# Patient Record
Sex: MALE | Race: BLACK OR AFRICAN AMERICAN | NOT HISPANIC OR LATINO | Employment: FULL TIME | URBAN - METROPOLITAN AREA
[De-identification: names, ages, dates, MRNs, and addresses within clinical notes are randomized per-mention and may not be internally consistent; named-entity substitution may affect disease eponyms.]

---

## 2017-04-05 ENCOUNTER — HOSPITAL ENCOUNTER (EMERGENCY)
Facility: CLINIC | Age: 27
Discharge: HOME | End: 2017-04-05
Attending: EMERGENCY MEDICINE | Admitting: EMERGENCY MEDICINE

## 2017-04-05 VITALS
RESPIRATION RATE: 16 BRPM | SYSTOLIC BLOOD PRESSURE: 134 MMHG | HEART RATE: 71 BPM | DIASTOLIC BLOOD PRESSURE: 83 MMHG | TEMPERATURE: 97.8 F | OXYGEN SATURATION: 99 %

## 2017-04-05 DIAGNOSIS — S61.211A LACERATION OF LEFT INDEX FINGER: ICD-10-CM
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
## 2017-04-05 PROCEDURE — 99283 EMERGENCY DEPT VISIT LOW MDM: CPT | Performed by: EMERGENCY MEDICINE

## 2017-04-05 PROCEDURE — 12001 RPR S/N/AX/GEN/TRNK 2.5CM/<: CPT | Mod: Z6 | Performed by: EMERGENCY MEDICINE

## 2017-04-05 PROCEDURE — 12001 RPR S/N/AX/GEN/TRNK 2.5CM/<: CPT | Performed by: EMERGENCY MEDICINE

## 2017-04-05 PROCEDURE — 99283 EMERGENCY DEPT VISIT LOW MDM: CPT | Mod: 25 | Performed by: EMERGENCY MEDICINE

## 2017-04-05 RX ORDER — LIDOCAINE HYDROCHLORIDE 10 MG/ML
10 INJECTION, SOLUTION INFILTRATION; PERINEURAL
Status: DISCONTINUED | OUTPATIENT
Start: 2017-04-05 | End: 2017-04-05 | Stop reason: HOSPADM

## 2017-04-05 ASSESSMENT — ENCOUNTER SYMPTOMS
BRUISES/BLEEDS EASILY: 0
FEVER: 0
CONSTITUTIONAL NEGATIVE: 1
HEADACHES: 0
LIGHT-HEADEDNESS: 0
NUMBNESS: 0
CHILLS: 0
FATIGUE: 0
WOUND: 1
NAUSEA: 0
ARTHRALGIAS: 0
VOMITING: 0
JOINT SWELLING: 0
DIZZINESS: 0
WEAKNESS: 0

## 2017-04-05 NOTE — ED PROVIDER NOTES
"  History     Chief Complaint   Patient presents with     Laceration     HPI  Jim Park is a 26 year old male who presents for evaluation of a left hand laceration. The patient reports that he was cleaning out a vacuum at work about 2-3 hours prior to his arrival here, when he inadvertently cut the palmar aspect of his left index finger on a razor that he didn't notice. He states the wound has bled actively since the incident. He reports that he sustained no other injury from this, and he has no other complaints presently. He reports that he is otherwise very healthy. The patient reports that his Tetanus is up-to-date, which is also confirmed in the [facility]. No neurovascular symptoms. No foreign body sensation.     I have reviewed the Medications, Allergies, Past Medical and Surgical History, and Social History in the Epic system.    Current Facility-Administered Medications   Medication     lidocaine 1 % injection 10 mL     No current outpatient prescriptions on file.     History reviewed. No pertinent past medical history.    History reviewed. No pertinent surgical history.    No family history on file.    Social History   Substance Use Topics     Smoking status: Current Every Day Smoker     Packs/day: 0.25     Smokeless tobacco: Not on file     Alcohol use Yes      Comment: \"Lots of Beata\"        Allergies   Allergen Reactions     Trazodone Nausea and Vomiting       Review of Systems   Constitutional: Negative.  Negative for chills, fatigue and fever.   Gastrointestinal: Negative for nausea and vomiting.   Musculoskeletal: Negative for arthralgias and joint swelling.   Skin: Positive for wound (laceration to palmar aspect of left index finger). Negative for rash.   Allergic/Immunologic: Negative for immunocompromised state.   Neurological: Negative for dizziness, weakness, light-headedness, numbness and headaches.   Hematological: Does not bruise/bleed easily.   All other systems reviewed and are " negative.      Physical Exam   BP: 134/83  Pulse: 63  Temp: 97.8  F (36.6  C)  Resp: 16  SpO2: 99 %  Physical Exam   Constitutional: He appears well-developed and well-nourished. No distress.   HENT:   Head: Normocephalic and atraumatic.   Eyes: Conjunctivae are normal.   Cardiovascular: Normal rate, regular rhythm, normal heart sounds and intact distal pulses.    Pulmonary/Chest: Effort normal and breath sounds normal. No respiratory distress.   Musculoskeletal: Normal range of motion. He exhibits no edema or deformity.        Left hand: He exhibits tenderness and laceration. He exhibits normal range of motion, no bony tenderness, normal two-point discrimination, normal capillary refill and no swelling. Normal sensation noted. Normal strength noted.   Neurological: He is alert. He has normal strength. No sensory deficit.   Skin: Skin is warm and dry. No rash noted. No erythema.   See laceration description.   Psychiatric: He has a normal mood and affect. His behavior is normal.   Nursing note and vitals reviewed.      ED Course     ED Course     1:10 AM  The patient was seen and examined by Darrell Knapp MD, in Room 09.     Procedures             Critical Care time:  [facility] Procedure Note        Laceration Repair:    Performed by: Darrell Knapp  Authorized by: Darrell Knapp  Consent given by: Patient who states understanding of the procedure being performed after discussing the risks, benefits and alternatives.    Preparation: Patient was prepped and draped in usual sterile fashion.  Irrigation solution: saline    Body area:left index finger  Laceration length: 1cm  Contamination: The wound is not contaminated.  Foreign bodies:none  Tendon involvement: none  Anesthesia: Local  Local anesthetic: Lidocaine     1%  Anesthetic total: 3ml    Debridement: none  Skin closure: Closed with 2 x 5.0 Ethilon  Technique: interrupted  Approximation: close  Approximation difficulty: simple    Patient  tolerance: Patient tolerated the procedure well with no immediate complications.            Labs Ordered and Resulted from Time of ED Arrival Up to the Time of Departure from the ED - No data to display    Assessments & Plan (with Medical Decision Making)   Laceration of left index finger. Laceration repair as noted. Wound check 2 days; suture removal 10 days. Laceration care information given.    I have reviewed the nursing notes.    I have reviewed the findings, diagnosis, plan and need for follow up with the patient.    There are no discharge medications for this patient.      Final diagnoses:   Laceration of left index finger     I, Augustine Azevedo, am serving as a trained medical scribe to document services personally performed by Darrell Knapp MD, based on the provider's statements to me.      IDarrell MD, was physically present and have reviewed and verified the accuracy of this note documented by Augustine Azevedo.    4/5/2017   [facility], EMERGENCY DEPARTMENT     Darrell Knapp MD  04/05/17 0154

## 2017-04-05 NOTE — ED AVS SNAPSHOT
[facility], Emergency Department    [address] [address]    Phone:  [phone]                                       Jim Park   MRN: [id number]    Department:  [facility], Emergency Department   Date of Visit:  4/5/2017           Patient Information     Date Of Birth          1990        Your diagnoses for this visit were:     Laceration of left index finger        You were seen by Darrell Knapp MD.        Discharge Instructions          * LACERATION (All Closures)  A laceration is a cut through the skin. This will usually require stitches (sutures) or staples if it is deep. Minor cuts may be treated with a tape closure ( Steri-Strips ) or Dermabond skin glue.       HOME CARE:  PAIN MEDICINE: You may use acetaminophen (Tylenol) 650-1000 mg every 6 hours or ibuprofen (Motrin, Advil) 600 mg every 6-8 hours with food to control pain, if you are able to take these medicines. [NOTE: If you have chronic liver or kidney disease or ever had a stomach ulcer or GI bleeding, talk with your doctor before using these medicines.]  EXTREMITY, FACE or TRUNK WOUNDS:    Keep the wound clean and dry. If a bandage was applied and it becomes wet or dirty, replace it. Otherwise, leave it in place for the first 24 hours.    If stitches or staples were used, clean the wound daily. Protect the wound from sunlight and tanning lamps.    After removing the bandage, wash the area with soap and water. Use a wet cotton swab (Q tip) to loosen and remove any blood or crust that forms.    After cleaning, apply a thin layer of Polysporin or Bacitracin ointment. This will keep the wound clean and make it easier to remove the stitches or staples. Reapply a fresh bandage.    You may remove the bandage to shower as usual after the first 24 hours, but do not soak the area in water (no swimming) until the stitches or staples are removed.    If Steri-Strips were used, keep the area clean and dry. If it becomes wet,  blot it dry with a towel. It is okay to take a brief shower, but avoid scrubbing the area.    If Dermabond skin adhesive was used, do not scratch, rub or pick at the adhesive film. Do not place tape directly over the film. Do not apply liquid, ointment or creams to the wound while the film is in place. Do not clean the wound with peroxide and do not apply ointments. Avoid activities that cause heavy sweating until the film has fallen off. Protect the wound from prolonged exposure to sunlight or tanning lamps. You may shower as usual but do not soak the wound in water (no baths or swimming). The film will fall off by itself in 5-10 days.  SCALP WOUNDS: During the first two days, you may carefully rinse your hair in the shower to remove blood, glass or dirt particles. After two days, you may shower and shampoo your hair normally. Do not soak your scalp in the tub or go swimming until the stitches or staples have been removed.  MOUTH WOUNDS: Eat soft foods to reduce pain. If the cut is inside of your mouth, clean by rinsing after each meal and at bedtime with a mixture of equal parts water and Hydrogen Peroxide (do not swallow!). Or, you can use a cotton swab to directly apply Hydrogen Peroxide onto the cut.  After the wound is done healing, use sunscreen over the area whenever exposed for the next 6 minths to avoid a darker scar.     FOLLOW UP: Most skin wounds heal within ten days. Mouth and facial wounds heal within five days. However, even with proper treatment, a wound infection may sometimes occur. Therefore, you should check the wound daily for signs of infection listed below.  Stitches should be removed from the face within five days; stitches and staples should be removed from other parts of the body within 7-10 days. Unless you are told to come back to the emergency room, you may have your doctor or urgent care remove the stitches. If dissolving stitches were used in the mouth, these will fall out or dissolve  without the need for removal. If tape closures ( Steri-Strips ) were used, remove them yourself if they have not fallen off after 7 days. If Dermabond skin glue was used, the film will fall off by itself in 5-10 days.      GET PROMPT MEDICAL ATTENTION  if any of the following occur:    Increasing pain in the wound    Redness, swelling or pus coming from the wound    Fever over 101 F (38.3 C) oral    If stitches or staples come apart or fall out or if Steri-Strips fall off before seven days    If the wound edges re-open    Bleeding not controlled by direct pressure    8395-9011 [facility], [address] [place] [address], [place] [address]. All rights reserved. This information is not intended as a substitute for professional medical care. Always follow your healthcare professional's instructions.  Keep wound clean, dry, and covered. Apply topical antibiotic to wound edges such as bacitracin or similar.  Wound check 2 days and suture removal at clinic in 10 days.  Return if any problems with wound healing, especially if bleeding, increased pain, loss of movement or sensation, fever, increased swelling.    24 Hour Appointment Hotline       To make an appointment at any [facility], call [phone] ([phone]). If you don't have a family doctor or clinic, we will help you find one. [facility] clinics are conveniently located to serve the needs of you and your family.             Review of your medicines      Notice     You have not been prescribed any medications.            Orders Needing Specimen Collection     None      Pending Results     No orders found from 4/3/2017 to 4/6/2017.            Pending Culture Results     No orders found from 4/3/2017 to 4/6/2017.            Thank you for choosing [facility]       Thank you for choosing [facility] for your care. Our goal is always to provide you with excellent care. Hearing back from our patients is one way we can continue to improve our services. Please take a few  "minutes to complete the written survey that you may receive in the mail after you visit with us. Thank you!        [facility] [employer] Information     [employer] lets you send messages to your doctor, view your test results, renew your prescriptions, schedule appointments and more. To sign up, go to [email or web address] . Click on \"Log in\" on the left side of the screen, which will take you to the Welcome page. Then click on \"Sign up Now\" on the right side of the page.     You will be asked to enter the access code listed below, as well as some personal information. Please follow the directions to create your username and password.     Your access code is: [email or web address]  Expires: 2017  1:33 AM     Your access code will  in 90 days. If you need help or a new code, please call your [facility] clinic or [phone].        Care EveryWhere ID     This is your Care EveryWhere ID. This could be used by other organizations to access your [facility] medical records  [id number]        After Visit Summary       This is your record. Keep this with you and show to your community pharmacist(s) and doctor(s) at your next visit.                  "

## 2017-04-05 NOTE — ED AVS SNAPSHOT
[facility], [place], Emergency Department    [address] [place] [address]    Phone:  [phone]                                       Jim Park   MRN: [id number]    Department:  [facility], Emergency Department   Date of Visit:  4/5/2017           After Visit Summary Signature Page     I have received my discharge instructions, and my questions have been answered. I have discussed any challenges I see with this plan with the nurse or doctor.    ..........................................................................................................................................  Patient/Patient Representative Signature      ..........................................................................................................................................  Patient Representative Print Name and Relationship to Patient    ..................................................               ................................................  Date                                            Time    ..........................................................................................................................................  Reviewed by Signature/Title    ...................................................              ..............................................  Date                                                            Time

## 2017-04-05 NOTE — DISCHARGE INSTRUCTIONS
* LACERATION (All Closures)  A laceration is a cut through the skin. This will usually require stitches (sutures) or staples if it is deep. Minor cuts may be treated with a tape closure ( Steri-Strips ) or Dermabond skin glue.       HOME CARE:  PAIN MEDICINE: You may use acetaminophen (Tylenol) 650-1000 mg every 6 hours or ibuprofen (Motrin, Advil) 600 mg every 6-8 hours with food to control pain, if you are able to take these medicines. [NOTE: If you have chronic liver or kidney disease or ever had a stomach ulcer or GI bleeding, talk with your doctor before using these medicines.]  EXTREMITY, FACE or TRUNK WOUNDS:    Keep the wound clean and dry. If a bandage was applied and it becomes wet or dirty, replace it. Otherwise, leave it in place for the first 24 hours.    If stitches or staples were used, clean the wound daily. Protect the wound from sunlight and tanning lamps.    After removing the bandage, wash the area with soap and water. Use a wet cotton swab (Q tip) to loosen and remove any blood or crust that forms.    After cleaning, apply a thin layer of Polysporin or Bacitracin ointment. This will keep the wound clean and make it easier to remove the stitches or staples. Reapply a fresh bandage.    You may remove the bandage to shower as usual after the first 24 hours, but do not soak the area in water (no swimming) until the stitches or staples are removed.    If Steri-Strips were used, keep the area clean and dry. If it becomes wet, blot it dry with a towel. It is okay to take a brief shower, but avoid scrubbing the area.    If Dermabond skin adhesive was used, do not scratch, rub or pick at the adhesive film. Do not place tape directly over the film. Do not apply liquid, ointment or creams to the wound while the film is in place. Do not clean the wound with peroxide and do not apply ointments. Avoid activities that cause heavy sweating until the film has fallen off. Protect the wound from prolonged  exposure to sunlight or tanning lamps. You may shower as usual but do not soak the wound in water (no baths or swimming). The film will fall off by itself in 5-10 days.  SCALP WOUNDS: During the first two days, you may carefully rinse your hair in the shower to remove blood, glass or dirt particles. After two days, you may shower and shampoo your hair normally. Do not soak your scalp in the tub or go swimming until the stitches or staples have been removed.  MOUTH WOUNDS: Eat soft foods to reduce pain. If the cut is inside of your mouth, clean by rinsing after each meal and at bedtime with a mixture of equal parts water and Hydrogen Peroxide (do not swallow!). Or, you can use a cotton swab to directly apply Hydrogen Peroxide onto the cut.  After the wound is done healing, use sunscreen over the area whenever exposed for the next 6 minths to avoid a darker scar.     FOLLOW UP: Most skin wounds heal within ten days. Mouth and facial wounds heal within five days. However, even with proper treatment, a wound infection may sometimes occur. Therefore, you should check the wound daily for signs of infection listed below.  Stitches should be removed from the face within five days; stitches and staples should be removed from other parts of the body within 7-10 days. Unless you are told to come back to the emergency room, you may have your doctor or urgent care remove the stitches. If dissolving stitches were used in the mouth, these will fall out or dissolve without the need for removal. If tape closures ( Steri-Strips ) were used, remove them yourself if they have not fallen off after 7 days. If Dermabond skin glue was used, the film will fall off by itself in 5-10 days.      GET PROMPT MEDICAL ATTENTION  if any of the following occur:    Increasing pain in the wound    Redness, swelling or pus coming from the wound    Fever over 101 F (38.3 C) oral    If stitches or staples come apart or fall out or if Steri-Strips fall  off before seven days    If the wound edges re-open    Bleeding not controlled by direct pressure    9446-7347 [employer], [address] [facility], [place] [address]. All rights reserved. This information is not intended as a substitute for professional medical care. Always follow your healthcare professional's instructions.  Keep wound clean, dry, and covered. Apply topical antibiotic to wound edges such as bacitracin or similar.  Wound check 2 days and suture removal at clinic in 10 days.  Return if any problems with wound healing, especially if bleeding, increased pain, loss of movement or sensation, fever, increased swelling.

## 2017-04-05 NOTE — ED NOTES
Pt arrived to ED after accidentally cutting his finger with a knife while cleaning. Pt states his finger has been bleeding for approx 2 hours. Finger has been cleaned during triage. Bleeding has stopped

## 2017-06-13 ENCOUNTER — HOSPITAL ENCOUNTER (EMERGENCY)
Facility: CLINIC | Age: 27
Discharge: HOME | End: 2017-06-13
Attending: EMERGENCY MEDICINE | Admitting: EMERGENCY MEDICINE

## 2017-06-13 ENCOUNTER — APPOINTMENT (OUTPATIENT)
Dept: GENERAL RADIOLOGY | Facility: CLINIC | Age: 27
End: 2017-06-13
Attending: EMERGENCY MEDICINE

## 2017-06-13 VITALS
OXYGEN SATURATION: 100 % | DIASTOLIC BLOOD PRESSURE: 80 MMHG | SYSTOLIC BLOOD PRESSURE: 155 MMHG | TEMPERATURE: 97.4 F | WEIGHT: 220 LBS | RESPIRATION RATE: 18 BRPM | HEART RATE: 85 BPM | BODY MASS INDEX: 33.34 KG/M2 | HEIGHT: 68 IN
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
## 2017-06-13 DIAGNOSIS — S60.211A CONTUSION OF RIGHT WRIST, INITIAL ENCOUNTER: ICD-10-CM

## 2017-06-13 PROCEDURE — 25000132 ZZH RX MED GY IP 250 OP 250 PS 637: Performed by: EMERGENCY MEDICINE

## 2017-06-13 PROCEDURE — 99283 EMERGENCY DEPT VISIT LOW MDM: CPT | Mod: Z6 | Performed by: EMERGENCY MEDICINE

## 2017-06-13 PROCEDURE — 99283 EMERGENCY DEPT VISIT LOW MDM: CPT | Performed by: EMERGENCY MEDICINE

## 2017-06-13 PROCEDURE — 29125 APPL SHORT ARM SPLINT STATIC: CPT | Mod: RT | Performed by: EMERGENCY MEDICINE

## 2017-06-13 PROCEDURE — 73110 X-RAY EXAM OF WRIST: CPT | Mod: RT

## 2017-06-13 RX ORDER — ACETAMINOPHEN 500 MG
1000 TABLET ORAL ONCE
Status: COMPLETED | OUTPATIENT
Start: 2017-06-13 | End: 2017-06-13

## 2017-06-13 RX ADMIN — ACETAMINOPHEN 1000 MG: 500 TABLET, FILM COATED ORAL at 03:40

## 2017-06-13 ASSESSMENT — ENCOUNTER SYMPTOMS
ABDOMINAL PAIN: 0
FEVER: 0
SHORTNESS OF BREATH: 0

## 2017-06-13 NOTE — DISCHARGE INSTRUCTIONS
Please make an appointment to follow up with Your Primary Care Provider in 7-10 days        Understanding Bone Bruise (Bone Contusion)  A bone bruise is an injury to a bone that is less severe than a bone fracture. Bone bruises are fairly common. They can happen to people of all ages. Any type of bone in your body can get a bone bruise. Other injuries often happen along with a bone bruise, such as damage to nearby ligaments.  What happens when a bone is bruised?  Bone is made of different kinds of tissue. The periosteum is a thin layer of tissue that covers most of a bone. Where bones come together, there is usually a layer of cartilage at the edges. The bone here is called subchondral bone. Deep inside the bone is an area called the medulla. It contains the bone marrow and fibrous tissue called trabeculae.  With a bone fracture, all of the trabeculae in a region of bone have broken. But with a bone bruise, an injury only damages some of these trabeculae. An injury might cause blood to build up in the area beneath the periosteum. This causes a subperiosteal hematoma, a type of bone bruise. An injury might also cause bleeding and swelling in the area between your cartilage and the bone beneath it. This causes a subchondral bone bruise. Or bleeding and swelling can occur in the medulla of your bone. This is called an interosseous bone bruise.  What causes a bone bruise?  Injury of any kind can cause a bone bruise. Sports injuries, motor vehicle accidents, or falls from a height can cause them. Twisting injuries that cause joint sprains can also cause a bone bruise. Health conditions like arthritis may also lead to a bone bruise. This is because arthritis causes bone surfaces to grind against each other. Child abuse is another cause of bone bruises.  Symptoms of a bone bruise  Symptoms of a bone bruise can include:    Pain and soreness in the injured area    Swelling in the area and soft tissues around it    Change in  color of the injured area    Swelling or stiffness of an injured joint  This pain is often more severe and lasts longer than a soft tissue injury. How severe your symptoms are and how long they last depends on how severe the bone bruise is.  Diagnosing a bone bruise  Your health care provider will ask you about your medical history and symptoms. He or she will ask how you got your injury. Your provider will examine the injured area to check for pain, bruising, and swelling. After the exam, your health care provider may be able to tell if you have a bone bruise.  A bone bruise doesn t show up on an X-ray. But you may be given an X-ray to rule out a bone fracture. A fracture may need a different kind of treatment. An MRI can confirm a bone bruise. But your health care provider will likely only give you an MRI if your symptoms don t get better.    8619-2924 [employer]. [address] [place] [address], [place] [address]. All rights reserved. This information is not intended as a substitute for professional medical care. Always follow your healthcare professional's instructions.

## 2017-06-13 NOTE — ED NOTES
"Triage Assessment & Note:    /87  Pulse 91  Temp 97.4  F (36.3  C) (Oral)  Resp 18  Ht 1.727 m (5' 8\")  Wt 99.8 kg (220 lb)  SpO2 100%  BMI 33.45 kg/m2    Patient presents with: right wrist pain. PT reports striking his right hand hard on something at work. t has positive CMS.     Home Treatments/Remedies: none    Febrile / Afebrile? afebrile    Duration of C/o:  24hrs    Len Mcbride  June 13, 2017      "

## 2017-06-13 NOTE — ED AVS SNAPSHOT
[facility], [place], Emergency Department    [address] [place] [address]    Phone:  [phone]                                       Jim Park   MRN: [id number]    Department:  [facility], Emergency Department   Date of Visit:  6/13/2017           After Visit Summary Signature Page     I have received my discharge instructions, and my questions have been answered. I have discussed any challenges I see with this plan with the nurse or doctor.    ..........................................................................................................................................  Patient/Patient Representative Signature      ..........................................................................................................................................  Patient Representative Print Name and Relationship to Patient    ..................................................               ................................................  Date                                            Time    ..........................................................................................................................................  Reviewed by Signature/Title    ...................................................              ..............................................  Date                                                            Time

## 2017-06-13 NOTE — ED AVS SNAPSHOT
[facility], Emergency Department    [address] [address]    Phone:  [phone]                                       Jim Park   MRN: [id number]    Department:  [facility], Emergency Department   Date of Visit:  6/13/2017           Patient Information     Date Of Birth          1990        Your diagnoses for this visit were:     Contusion of right wrist, initial encounter        You were seen by Cody Reed MD.        Discharge Instructions       Please make an appointment to follow up with Your Primary Care Provider in 7-10 days        Understanding Bone Bruise (Bone Contusion)  A bone bruise is an injury to a bone that is less severe than a bone fracture. Bone bruises are fairly common. They can happen to people of all ages. Any type of bone in your body can get a bone bruise. Other injuries often happen along with a bone bruise, such as damage to nearby ligaments.  What happens when a bone is bruised?  Bone is made of different kinds of tissue. The periosteum is a thin layer of tissue that covers most of a bone. Where bones come together, there is usually a layer of cartilage at the edges. The bone here is called subchondral bone. Deep inside the bone is an area called the medulla. It contains the bone marrow and fibrous tissue called trabeculae.  With a bone fracture, all of the trabeculae in a region of bone have broken. But with a bone bruise, an injury only damages some of these trabeculae. An injury might cause blood to build up in the area beneath the periosteum. This causes a subperiosteal hematoma, a type of bone bruise. An injury might also cause bleeding and swelling in the area between your cartilage and the bone beneath it. This causes a subchondral bone bruise. Or bleeding and swelling can occur in the medulla of your bone. This is called an interosseous bone bruise.  What causes a bone bruise?  Injury of any kind can cause a bone bruise. Sports injuries,  motor vehicle accidents, or falls from a height can cause them. Twisting injuries that cause joint sprains can also cause a bone bruise. Health conditions like arthritis may also lead to a bone bruise. This is because arthritis causes bone surfaces to grind against each other. Child abuse is another cause of bone bruises.  Symptoms of a bone bruise  Symptoms of a bone bruise can include:    Pain and soreness in the injured area    Swelling in the area and soft tissues around it    Change in color of the injured area    Swelling or stiffness of an injured joint  This pain is often more severe and lasts longer than a soft tissue injury. How severe your symptoms are and how long they last depends on how severe the bone bruise is.  Diagnosing a bone bruise  Your health care provider will ask you about your medical history and symptoms. He or she will ask how you got your injury. Your provider will examine the injured area to check for pain, bruising, and swelling. After the exam, your health care provider may be able to tell if you have a bone bruise.  A bone bruise doesn t show up on an X-ray. But you may be given an X-ray to rule out a bone fracture. A fracture may need a different kind of treatment. An MRI can confirm a bone bruise. But your health care provider will likely only give you an MRI if your symptoms don t get better.    7305-7843 [employer]. [address] [place] [address]. All rights reserved. This information is not intended as a substitute for professional medical care. Always follow your healthcare professional's instructions.           24 Hour Appointment Hotline       To make an appointment at any [facility] clinic, call [phone] ([phone]). If you don't have a family doctor or clinic, we will help you find one. [facility] clinics are conveniently located to serve the needs of you and your family.          ED Discharge Orders     Wrist splint velcro                   "  Review of your medicines      Notice     You have not been prescribed any medications.            Procedures and tests performed during your visit     Wrist XR, G/E 3 views, right      Orders Needing Specimen Collection     None      Pending Results     Date and Time Order Name Status Description    2017 0306 Wrist XR, G/E 3 views, right Preliminary             Pending Culture Results     No orders found from 2017 to 2017.            Pending Results Instructions     If you had any lab results that were not finalized at the time of your Discharge, you can call the ED Lab Result RN at [phone]. You will be contacted by this team for any positive Lab results or changes in treatment. The nurses are available 7 days a week from 10A to 6:30P.  You can leave a message 24 hours per day and they will return your call.        Thank you for choosing [facility]       Thank you for choosing [facility] for your care. Our goal is always to provide you with excellent care. Hearing back from our patients is one way we can continue to improve our services. Please take a few minutes to complete the written survey that you may receive in the mail after you visit with us. Thank you!        [employer] Information     [employer] lets you send messages to your doctor, view your test results, renew your prescriptions, schedule appointments and more. To sign up, go to [email or web address] . Click on \"Log in\" on the left side of the screen, which will take you to the Welcome page. Then click on \"Sign up Now\" on the right side of the page.     You will be asked to enter the access code listed below, as well as some personal information. Please follow the directions to create your username and password.     Your access code is: [email or web address]  Expires: 2017  1:33 AM     Your access code will  in 90 days. If you need help or a new code, please call your [facility] clinic or [phone].        Care EveryWhere ID     This is " your Care EveryWhere ID. This could be used by other organizations to access your [facility] medical records  [id number]        After Visit Summary       This is your record. Keep this with you and show to your community pharmacist(s) and doctor(s) at your next visit.

## 2017-06-13 NOTE — ED PROVIDER NOTES
"  History     Chief Complaint   Patient presents with     Wrist Pain     HPI  Jim Park is a 27 year old right handed male who presents with right wrist pain.  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  Has had worsening pain.  Has noticed worsening swelling.  Denies any numbness and tingling.  No fevers or chills.  No other pain.  Has not taken anything for this.    I have reviewed the Medications, Allergies, Past Medical and Surgical History, and Social History in the Epic system.  No past medical history on file.    No past surgical history on file.    No family history on file.    Social History   Substance Use Topics     Smoking status: Current Every Day Smoker     Packs/day: 0.25     Smokeless tobacco: Not on file     Alcohol use Yes      Comment: \"Lots of Beata\"       Review of Systems   Constitutional: Negative for fever.   Respiratory: Negative for shortness of breath.    Cardiovascular: Negative for chest pain.   Gastrointestinal: Negative for abdominal pain.   All other systems reviewed and are negative.      Physical Exam   BP: 162/87  Pulse: 91  Temp: 97.4  F (36.3  C)  Resp: 18  Height: 172.7 cm (5' 8\")  Weight: 99.8 kg (220 lb)  SpO2: 100 %  Physical Exam   Musculoskeletal:        Right elbow: Normal.       Right wrist: He exhibits tenderness, bony tenderness and swelling. He exhibits normal range of motion, no effusion, no crepitus and no deformity.        Arms:  Neurological: He has normal strength. No sensory deficit.       ED Course     ED Course     Procedures             Critical Care time:  none              Results for orders placed or performed during the hospital encounter of 06/13/17   Wrist XR, G/E 3 views, right    Narrative    XR WRIST RT G/E 3 VW 6/13/2017 3:36 AM    History: lateral wrist pain.  hit on door.    Comparison: None.    Findings: PA, oblique, and lateral views of the right wrist. There is  normal bony alignment. No acute fracture " or subluxation is identified.  Minimal soft tissue swelling.      Impression    Impression: No acute osseous abnormality.        Labs Ordered and Resulted from Time of ED Arrival Up to the Time of Departure from the ED - No data to display         Assessments & Plan (with Medical Decision Making)   27 year old who presents with wrist pain.  Did review the x-rays.  No signs of fracture.  Likely contusion.  Discussed supportive care.  Given wrist splint for comfort.  He will continue Tylenol and ibuprofen at home.  He'll return for worsening symptoms.    I have reviewed the nursing notes.    I have reviewed the findings, diagnosis, plan and need for follow up with the patient.    There are no discharge medications for this patient.      Final diagnoses:   Contusion of right wrist, initial encounter       6/13/2017   [facility], [place], EMERGENCY DEPARTMENT     Cody Reed MD  06/13/17 9198

## 2017-09-16 ENCOUNTER — HOSPITAL ENCOUNTER (EMERGENCY)
Facility: CLINIC | Age: 27
Discharge: HOME | End: 2017-09-16
Attending: EMERGENCY MEDICINE | Admitting: EMERGENCY MEDICINE

## 2017-09-16 VITALS
DIASTOLIC BLOOD PRESSURE: 89 MMHG | BODY MASS INDEX: 30.84 KG/M2 | TEMPERATURE: 98.1 F | OXYGEN SATURATION: 98 % | WEIGHT: 202.82 LBS | HEART RATE: 82 BPM | SYSTOLIC BLOOD PRESSURE: 141 MMHG

## 2017-09-16 DIAGNOSIS — R07.0 THROAT PAIN: ICD-10-CM

## 2017-09-16 DIAGNOSIS — J06.9 VIRAL URI WITH COUGH: ICD-10-CM

## 2017-09-16 LAB
DEPRECATED S PYO AG THROAT QL EIA: NORMAL
SPECIMEN SOURCE: NORMAL

## 2017-09-16 PROCEDURE — 87880 STREP A ASSAY W/OPTIC: CPT | Performed by: EMERGENCY MEDICINE

## 2017-09-16 PROCEDURE — 99283 EMERGENCY DEPT VISIT LOW MDM: CPT

## 2017-09-16 PROCEDURE — 99283 EMERGENCY DEPT VISIT LOW MDM: CPT | Mod: Z6 | Performed by: EMERGENCY MEDICINE

## 2017-09-16 PROCEDURE — 87081 CULTURE SCREEN ONLY: CPT | Performed by: EMERGENCY MEDICINE

## 2017-09-16 RX ORDER — ACETAMINOPHEN 325 MG/1
650 TABLET ORAL ONCE
Status: DISCONTINUED | OUTPATIENT
Start: 2017-09-16 | End: 2017-09-17 | Stop reason: HOSPADM

## 2017-09-16 NOTE — ED AVS SNAPSHOT
[facility], Emergency Department    [address] [address]    Phone:  [phone]                                       Jim Park   MRN: [id number]    Department:  [facility], Emergency Department   Date of Visit:  9/16/2017           Patient Information     Date Of Birth          1990        Your diagnoses for this visit were:     Throat pain     Viral URI with cough        You were seen by Juan Ayala MD.        Discharge Instructions       Your rapid strep swab was negative.       Please make an appointment to [facility] with [facility] (phone: [phone] in 3-5 days if not improving.    Return to the ED if you are having difficulty breathing, firmness under the tongue, worsening symptoms, or any other urgent/life-threatening concerns.       24 Hour Appointment Hotline       To make an appointment at any [facility] clinic, call [phone] ([phone]). If you don't have a family doctor or clinic, we will help you find one. [facility] clinics are conveniently located to serve the needs of you and your family.             Review of your medicines      Notice     You have not been prescribed any medications.            Procedures and tests performed during your visit     Beta strep group A culture    Rapid strep screen      Orders Needing Specimen Collection     None      Pending Results     Date and Time Order Name Status Description    9/16/2017 2125 Beta strep group A culture Preliminary             Pending Culture Results     Date and Time Order Name Status Description    9/16/2017 2125 Beta strep group A culture Preliminary             Pending Results Instructions     If you had any lab results that were not finalized at the time of your Discharge, you can call the ED Lab Result RN at [phone]. You will be contacted by this team for any positive Lab results or changes in treatment. The nurses are available 7 days a week from 10A to 6:30P.  You can  "leave a message 24 hours per day and they will return your call.        Thank you for choosing [facility]       Thank you for choosing [facility] for your care. Our goal is always to provide you with excellent care. Hearing back from our patients is one way we can continue to improve our services. Please take a few minutes to complete the written survey that you may receive in the mail after you visit with us. Thank you!        [employer] Information     [employer] lets you send messages to your doctor, view your test results, renew your prescriptions, schedule appointments and more. To sign up, go to [email or web address] . Click on \"Log in\" on the left side of the screen, which will take you to the Welcome page. Then click on \"Sign up Now\" on the right side of the page.     You will be asked to enter the access code listed below, as well as some personal information. Please follow the directions to create your username and password.     Your access code is: [email or web address]  Expires: 12/15/2017 10:42 PM     Your access code will  in 90 days. If you need help or a new code, please call your [facility] clinic or [phone].        Care EveryWhere ID     This is your Care EveryWhere ID. This could be used by other organizations to access your [facility] medical records  [id number]        Equal Access to Services     SIDNEY POE : Hadafrica starkso Sosocorro, waaxda luqadaha, qaybta kaalmada adedaeyaanne, weston pinzon . So [facility] [phone].    ATENCIÓN: Si habla español, tiene a nation disposición servicios gratuitos de asistencia lingüística. Llame al [phone].    We comply with applicable federal civil rights laws and [place] laws. We do not discriminate on the basis of race, color, national origin, age, disability sex, sexual orientation or gender identity.            After Visit Summary       This is your record. Keep this with you and show to your community pharmacist(s) and doctor(s) at " your next visit.

## 2017-09-16 NOTE — ED AVS SNAPSHOT
[facility], [place], Emergency Department    [address] [place] [address]    Phone:  [phone]                                       Jim Park   MRN: [id number]    Department:  [facility], Emergency Department   Date of Visit:  9/16/2017           After Visit Summary Signature Page     I have received my discharge instructions, and my questions have been answered. I have discussed any challenges I see with this plan with the nurse or doctor.    ..........................................................................................................................................  Patient/Patient Representative Signature      ..........................................................................................................................................  Patient Representative Print Name and Relationship to Patient    ..................................................               ................................................  Date                                            Time    ..........................................................................................................................................  Reviewed by Signature/Title    ...................................................              ..............................................  Date                                                            Time

## 2017-09-17 NOTE — ED PROVIDER NOTES
"  History     Chief Complaint   Patient presents with     Pharyngitis     Headache     HPI  Jim Park is a 27 year old otherwise healthy male who presents with sore throat. Patient complains he has had cold-type symptoms over the past one week with sore throat, hoarse voice, cough, shortness of breath, and generalized fatigue. Tonight he complains that his sore throat has continued to worsen and today he began having pain with swallowing. He also complains of a right-sided headache that began last night as well. He did take ibuprofen for his headache prior to arrival which helped. No vision changes. No photophobia or sensitivity to noise. He denies fevers or chills. He notes his brother was recently ill with similar symptoms as well. He is not on any daily medications.      I have reviewed the Medications, Allergies, Past Medical and Surgical History, and Social History in the [employer] system.  History reviewed. No pertinent past medical history.    History reviewed. No pertinent surgical history.    No family history on file.    Social History   Substance Use Topics     Smoking status: Current Every Day Smoker     Packs/day: 0.25     Smokeless tobacco: Not on file     Alcohol use Yes      Comment: \"Lots of Beata\"       No current facility-administered medications for this encounter.      No current outpatient prescriptions on file.        Allergies   Allergen Reactions     Trazodone Nausea and Vomiting       Review of Systems  A complete 14-system review of systems was completed with pertinent positives and negatives noted in the HPI, otherwise negative.     Physical Exam   BP: 141/89  Pulse: 82  Temp: 98.1  F (36.7  C)  Weight: 92 kg (202 lb 13.2 oz)  SpO2: 98 %  Physical Exam  /89  Pulse 82  Temp 98.1  F (36.7  C) (Oral)  Wt 92 kg (202 lb 13.2 oz)  SpO2 98%  BMI 30.84 kg/m2  General: well-appearing, no acute distress  HENT: MMM, no oropharyngeal lesions, no tonsillar hypertrophy, no sublingual " firmness.   Eyes: PERRL, normal sclerae, no conjunctival pallor  Neck: non-tender, supple. No pain with tracheal manipulation.   Cardio: RRR, normal heart sounds, extremities well perfused  Resp: Normal work of breathing, clear breath sounds  Chest/Back: no visual signs of trauma, no CVA tenderness  Abdomen: no tenderness, non-distended, no rebound, no guarding  Neuro: alert and fully oriented. CN II-XII grossly intact. Grossly normal strength and sensation in all extremities.   MSK: no deformities.   Integumentary/Skin: no rash, normal color  Psych: normal affect, normal behavior    ED Course     ED Course     Procedures       9:35 PM  The patient was seen and examined by Dr. Ayala in Room [facility].       Critical Care time:  None        Labs Ordered and Resulted from Time of ED Arrival Up to the Time of Departure from the ED   RAPID STREP SCREEN   BETA STREP GROUP A CULTURE            Assessments & Plan (with Medical Decision Making)   In the ED, the patient was afebrile and hemodynamically stable. History notable for congestion, sore throat, and cough. Exam notable for lack of concerning findings on oropharyngeal exam. No fever nor toxic appearance to suggest serious bacterial infection. No pain with tracheal manipulation to suggest deep neck space infection, no sublingual firmness to suggest Bertram's. The patient was given APAP for headache with improvement in symptoms. Rapid strep negative.     The complete clinical picture is most consistent with viral URI causing sore throat. After counseling on the diagnosis, work-up, and treatment plan, the patient was discharged to home. APAP and ibuprofen recommended for mild headache. Patient counseled that he would receive a call if the culture grows positive. The patient was advised to follow-up with clinic in a few days if not improved. The patient was advised to return to the ED if worsening symptoms, or if there are any urgent/life-threatening concerns.       Clinical  Impression:   Sore throat   Likely viral URI     Juan Ayala MD  Emergency Medicine       I have reviewed the nursing notes.    I have reviewed the findings, diagnosis, plan and need for follow up with the patient.    There are no discharge medications for this patient.      Final diagnoses:   Throat pain   Viral URI with cough   I, Deandra Ballard, am serving as a trained medical scribe to document services personally performed by Juan Ayala MD, based on the provider's statements to me.   IJuan MD, was physically present and have reviewed and verified the accuracy of this note documented by Deandra Ballard.      9/16/2017   [facility], [place], EMERGENCY DEPARTMENT     Juan Ayala MD  09/17/17 0427

## 2017-09-17 NOTE — DISCHARGE INSTRUCTIONS
Your rapid strep swab was negative.       Please make an appointment to follow [facility] with [facility] (phone: [phone] in 3-5 days if not improving.    Return to the ED if you are having difficulty breathing, firmness under the tongue, worsening symptoms, or any other urgent/life-threatening concerns.

## 2017-09-17 NOTE — ED NOTES
Patient arrived for a sore throat that has been bothering him for two weeks. He says it is hard to breathe and swallow. His body is also sore. He is alert, oriented, ambulatory.

## 2017-09-18 LAB
BACTERIA SPEC CULT: NORMAL
Lab: NORMAL
SPECIMEN SOURCE: NORMAL

## 2024-02-16 ENCOUNTER — HOSPITAL ENCOUNTER (EMERGENCY)
Facility: CLINIC | Age: 34
Discharge: HOME | End: 2024-02-16
Payer: MEDICAID

## 2024-02-16 VITALS
HEART RATE: 80 BPM | RESPIRATION RATE: 16 BRPM | TEMPERATURE: 97.6 F | WEIGHT: 226 LBS | HEIGHT: 69 IN | BODY MASS INDEX: 33.47 KG/M2 | OXYGEN SATURATION: 98 % | SYSTOLIC BLOOD PRESSURE: 149 MMHG | DIASTOLIC BLOOD PRESSURE: 104 MMHG

## 2024-02-16 DIAGNOSIS — S61.211A LACERATION OF LEFT INDEX FINGER: ICD-10-CM

## 2024-02-16 PROCEDURE — 12001 RPR S/N/AX/GEN/TRNK 2.5CM/<: CPT

## 2024-02-16 PROCEDURE — 99283 EMERGENCY DEPT VISIT LOW MDM: CPT | Mod: 25

## 2024-02-16 RX ORDER — IBUPROFEN 600 MG/1
600 TABLET, FILM COATED ORAL ONCE
Status: DISCONTINUED | OUTPATIENT
Start: 2024-02-16 | End: 2024-02-16 | Stop reason: HOSPADM

## 2024-02-16 RX ORDER — LIDOCAINE HYDROCHLORIDE 10 MG/ML
10 INJECTION, SOLUTION INFILTRATION; PERINEURAL ONCE
Status: DISCONTINUED | OUTPATIENT
Start: 2024-02-16 | End: 2024-02-16 | Stop reason: HOSPADM

## 2024-02-16 ASSESSMENT — ACTIVITIES OF DAILY LIVING (ADL): ADLS_ACUITY_SCORE: 35

## 2024-02-16 NOTE — ED TRIAGE NOTES
Patient not sure when he got his tetanus vaccine.      Triage Assessment (Adult)       Row Name 02/16/24 1102          Triage Assessment    Airway WDL WDL        Respiratory WDL    Respiratory WDL WDL        Skin Circulation/Temperature WDL    Skin Circulation/Temperature WDL X  finger laceration        Cardiac WDL    Cardiac WDL WDL        Peripheral/Neurovascular WDL    Peripheral Neurovascular WDL WDL

## 2024-02-16 NOTE — ED PROVIDER NOTES
ED Provider Note  [facility]      History     Chief Complaint   Patient presents with    Laceration     Left index finger laceration, patient was playing with a knife and cut self last night     The history is provided by the patient. No  was used.     Jim Park is a 33 year old male who presents to the ED with complaint of a laceration.  Past medical history notable for anxiety, alcohol use disorder, PTSD.  He states that he accidentally cut the dorsal side of his left index finger last night around 11:30 PM.  He states that he was attempting to open a package with a knife when it slipped cutting his left index finger.  He states the knife was clean at the time.  He states that he went to bed with it covered but was concerned when he woke up this morning due to the dressing being saturated with blood prompting his arrival to the ED for further evaluation.  Bleeding is controlled at this time with only some mild oozing from the laceration site.  He initially reports numbness to his affected finger and hand but when the bandage was taken off, he complained of moderate to severe pain to the site, which was reassuring for no numbness to the area over the affected finger.  He denies any bony tenderness.  He states that he did rinse it with water after the initial injury before placing the bandage on it last night.  His last tetanus vaccination was September 2019.    Past Medical History  History reviewed. No pertinent past medical history.  History reviewed. No pertinent surgical history.  No current outpatient medications on file.    Allergies   Allergen Reactions    Trazodone Nausea and Vomiting     Family History  History reviewed. No pertinent family history.  Social History   Social History     Tobacco Use    Smoking status: Every Day     Packs/day: .25     Types: Cigarettes   Substance Use Topics    Alcohol use: Yes     Comment: Socially    Drug use: Yes      "Types: Marijuana     Comment: Daily      Past medical history, past surgical history, medications, allergies, family history, and social history were reviewed with the patient. No additional pertinent items.      A medically appropriate review of systems was performed with pertinent positives and negatives noted in the HPI, and all other systems negative.    Physical Exam   BP: (!) 149/104  Pulse: 80  Temp: 97.6  F (36.4  C)  Resp: 16  Height: 175.3 cm (5' 9\")  Weight: 102.5 kg (226 lb)  SpO2: 98 %  Physical Exam  Constitutional:       General: He is not in acute distress.     Appearance: Normal appearance. He is not ill-appearing, toxic-appearing or diaphoretic.   HENT:      Mouth/Throat:      Mouth: Mucous membranes are moist.   Cardiovascular:      Rate and Rhythm: Normal rate and regular rhythm.      Pulses: Normal pulses.           Radial pulses are 2+ on the right side and 2+ on the left side.   Pulmonary:      Effort: Pulmonary effort is normal.      Breath sounds: Normal breath sounds.   Musculoskeletal:      Right hand: Normal.      Left hand: Laceration and tenderness (laceration site) present. No swelling, deformity or bony tenderness. Decreased range of motion (due to pain only). Normal strength. Normal sensation. There is no disruption of two-point discrimination. Normal capillary refill. Normal pulse.        Hands:    Neurological:      Mental Status: He is alert.      Sensory: Sensation is intact.      Motor: Motor function is intact.           ED Course, Procedures, & Data      [facility]    -Laceration Repair    Date/Time: 2/16/2024 12:10 PM    Performed by: Lisa Forde PA-C  Authorized by: Lisa Forde PA-C    Risks, benefits and alternatives discussed.      ANESTHESIA (see MAR for exact dosages):     Anesthesia method:  Nerve block    Block location:  Left index finger    Block needle gauge:  27 G    Block anesthetic:  Lidocaine 1% w/o " epi    Block technique:  Digitial block    Block injection procedure:  Anatomic landmarks identified, introduced needle, negative aspiration for blood, anatomic landmarks palpated and incremental injection    Block outcome:  Anesthesia achieved    LACERATION DETAILS     Location:  Finger    Finger location:  L index finger    Length (cm):  1    REPAIR TYPE:     Repair type:  Simple    EXPLORATION:     Hemostasis achieved with:  Direct pressure    Wound exploration: wound explored through full range of motion and entire depth of wound probed and visualized      Wound extent: no foreign body, no nerve damage and no tendon damage      Contaminated: no      TREATMENT:     Area cleansed with:  Saline    Amount of cleaning:  Standard    Irrigation solution:  Sterile saline    Irrigation volume:  500    Irrigation method:  Pressure wash    Visualized foreign bodies/material removed: no      SKIN REPAIR     Repair method:  Sutures    Suture size:  5-0    Suture material:  Nylon    Suture technique:  Simple interrupted    Number of sutures:  6    APPROXIMATION     Approximation:  Close    POST-PROCEDURE DETAILS     Dressing:  Antibiotic ointment and bulky dressing      PROCEDURE    Patient Tolerance:  Patient tolerated the procedure well with no immediate complications                No results found for any visits on 02/16/24.  Medications   Tdap (tetanus-diphtheria-acell pertussis) (ADACEL) injection 0.5 mL (has no administration in time range)   lidocaine 1 % injection 10 mL (has no administration in time range)   ibuprofen (ADVIL/MOTRIN) tablet 600 mg (has no administration in time range)     Labs Ordered and Resulted from Time of ED Arrival to Time of ED Departure - No data to display  No orders to display          Critical care was not performed.     Medical Decision Making  The patient's presentation was of low complexity (an acute and uncomplicated illness or injury).    The patient's evaluation involved:  history  and exam without other MDM data elements    The patient's management necessitated moderate risk (a decision regarding minor procedure (laceration repair) with risk factors of none).    Assessment & Plan    Jim is a 33-year-old male that presented with complaints of a left index finger laceration.  Acceptable vital signs with mild elevation in diastolic blood pressure 104.  Patient in mild acute distress due to reported pain and discomfort but otherwise nontoxic-appearing.  No numbness elicited on exam with otherwise equal strength of the left index finger with flexion, extension, abduction, and adduction against resistance.  Digital block successful.  Laceration repaired by ED ABAD without any acute complications or pain by the patient.  Bacitracin and dressing was placed by nursing.  Offered a protective splint for which patient declined.  Tetanus vaccination updated today.  P.o. ibuprofen for generalized inflammation and pain.  Patient stable for discharge home.  Strict return precautions given.  Advise suture removal in 7 to 10 days by PCP office.  Wound care instructions given in the paperwork and discussed at length prior to discharge.  Advised Tylenol and ibuprofen as needed for pain.  Patient was agreeable to the discharge treatment plan, voiced understanding, and all questions answered prior to discharge.    I have reviewed the nursing notes. I have reviewed the findings, diagnosis, plan and need for follow up with the patient.    New Prescriptions    No medications on file       Final diagnoses:   Laceration of left index finger       Lisa Forde PA-C    [facility] EMERGENCY DEPARTMENT  2/16/2024     Lisa Forde PA-C  02/16/24 1257

## 2024-02-16 NOTE — DISCHARGE INSTRUCTIONS
Have sutures removed in 7 to 10 days by your PCP office or other [facility] or urgent care  Keep the wound area clean and covered especially while you are at work as a protective from moisture or chemicals; may utilize thin layer of Vaseline or Aquaphor to the area once a day to help keep the protective barrier and help with wound healing and keeping the suture soft  Do not submerge the wound area in water until the sutures are removed and wound is completely healed as to not introduce infection to the area  Watch for possible development of signs or symptoms of infection including fever, chills, intractable nausea or vomiting, worsening pain, swelling, redness or presence of purulent drainage from the wound site; if these signs or symptoms develop please seek urgent medical attention and wound check  Otherwise do not hesitate to return to the ED if any worsening or concerning signs or symptoms present

## 2024-05-29 LAB
ALBUMIN SERPL BCG-MCNC: 4.2 G/DL (ref 3.5–5.2)
ALP SERPL-CCNC: 103 U/L (ref 40–150)
ALT SERPL W P-5'-P-CCNC: 39 U/L (ref 0–70)
ANION GAP SERPL CALCULATED.3IONS-SCNC: 15 MMOL/L (ref 7–15)
AST SERPL W P-5'-P-CCNC: 31 U/L (ref 0–45)
BASOPHILS # BLD AUTO: 0 10E3/UL (ref 0–0.2)
BASOPHILS NFR BLD AUTO: 0 %
BILIRUB SERPL-MCNC: 1.4 MG/DL
BUN SERPL-MCNC: 8.7 MG/DL (ref 6–20)
CALCIUM SERPL-MCNC: 9.2 MG/DL (ref 8.6–10)
CHLORIDE SERPL-SCNC: 100 MMOL/L (ref 98–107)
CREAT SERPL-MCNC: 0.85 MG/DL (ref 0.67–1.17)
DEPRECATED HCO3 PLAS-SCNC: 21 MMOL/L (ref 22–29)
EGFRCR SERPLBLD CKD-EPI 2021: >90 ML/MIN/1.73M2
EOSINOPHIL # BLD AUTO: 0.3 10E3/UL (ref 0–0.7)
EOSINOPHIL NFR BLD AUTO: 3 %
ERYTHROCYTE [DISTWIDTH] IN BLOOD BY AUTOMATED COUNT: 11.8 % (ref 10–15)
FLUAV RNA SPEC QL NAA+PROBE: NEGATIVE
FLUBV RNA RESP QL NAA+PROBE: NEGATIVE
GLUCOSE SERPL-MCNC: 107 MG/DL (ref 70–99)
HCT VFR BLD AUTO: 43.2 % (ref 40–53)
HGB BLD-MCNC: 15.9 G/DL (ref 13.3–17.7)
HOLD SPECIMEN: NORMAL
HOLD SPECIMEN: NORMAL
IMM GRANULOCYTES # BLD: 0 10E3/UL
IMM GRANULOCYTES NFR BLD: 0 %
LIPASE SERPL-CCNC: 23 U/L (ref 13–60)
LYMPHOCYTES # BLD AUTO: 0.9 10E3/UL (ref 0.8–5.3)
LYMPHOCYTES NFR BLD AUTO: 9 %
MCH RBC QN AUTO: 33.2 PG (ref 26.5–33)
MCHC RBC AUTO-ENTMCNC: 36.8 G/DL (ref 31.5–36.5)
MCV RBC AUTO: 90 FL (ref 78–100)
MONOCYTES # BLD AUTO: 1.2 10E3/UL (ref 0–1.3)
MONOCYTES NFR BLD AUTO: 12 %
NEUTROPHILS # BLD AUTO: 7.8 10E3/UL (ref 1.6–8.3)
NEUTROPHILS NFR BLD AUTO: 76 %
NRBC # BLD AUTO: 0 10E3/UL
NRBC BLD AUTO-RTO: 0 /100
PLATELET # BLD AUTO: 207 10E3/UL (ref 150–450)
POTASSIUM SERPL-SCNC: 3.7 MMOL/L (ref 3.4–5.3)
PROT SERPL-MCNC: 7.9 G/DL (ref 6.4–8.3)
RBC # BLD AUTO: 4.79 10E6/UL (ref 4.4–5.9)
RSV RNA SPEC NAA+PROBE: NEGATIVE
SARS-COV-2 RNA RESP QL NAA+PROBE: POSITIVE
SODIUM SERPL-SCNC: 136 MMOL/L (ref 135–145)
WBC # BLD AUTO: 10.3 10E3/UL (ref 4–11)

## 2024-05-29 PROCEDURE — 87637 SARSCOV2&INF A&B&RSV AMP PRB: CPT | Performed by: EMERGENCY MEDICINE

## 2024-05-29 PROCEDURE — 83690 ASSAY OF LIPASE: CPT | Performed by: EMERGENCY MEDICINE

## 2024-05-29 PROCEDURE — 85025 COMPLETE CBC W/AUTO DIFF WBC: CPT | Performed by: EMERGENCY MEDICINE

## 2024-05-29 PROCEDURE — 99285 EMERGENCY DEPT VISIT HI MDM: CPT

## 2024-05-29 PROCEDURE — 86140 C-REACTIVE PROTEIN: CPT | Performed by: INTERNAL MEDICINE

## 2024-05-29 PROCEDURE — 36415 COLL VENOUS BLD VENIPUNCTURE: CPT | Performed by: EMERGENCY MEDICINE

## 2024-05-29 PROCEDURE — 80053 COMPREHEN METABOLIC PANEL: CPT | Performed by: EMERGENCY MEDICINE

## 2024-05-29 PROCEDURE — 85379 FIBRIN DEGRADATION QUANT: CPT | Performed by: INTERNAL MEDICINE

## 2024-05-29 PROCEDURE — 82040 ASSAY OF SERUM ALBUMIN: CPT | Performed by: EMERGENCY MEDICINE

## 2024-05-29 ASSESSMENT — COLUMBIA-SUICIDE SEVERITY RATING SCALE - C-SSRS
1. IN THE PAST MONTH, HAVE YOU WISHED YOU WERE DEAD OR WISHED YOU COULD GO TO SLEEP AND NOT WAKE UP?: NO
6. HAVE YOU EVER DONE ANYTHING, STARTED TO DO ANYTHING, OR PREPARED TO DO ANYTHING TO END YOUR LIFE?: NO
2. HAVE YOU ACTUALLY HAD ANY THOUGHTS OF KILLING YOURSELF IN THE PAST MONTH?: NO

## 2024-05-30 ENCOUNTER — HOSPITAL ENCOUNTER (INPATIENT)
Facility: CLINIC | Age: 34
LOS: 1 days | Discharge: HOME | End: 2024-05-30
Attending: EMERGENCY MEDICINE | Admitting: INTERNAL MEDICINE
Payer: COMMERCIAL

## 2024-05-30 VITALS
HEIGHT: 69 IN | BODY MASS INDEX: 33.37 KG/M2 | SYSTOLIC BLOOD PRESSURE: 145 MMHG | OXYGEN SATURATION: 92 % | HEART RATE: 86 BPM | TEMPERATURE: 98.3 F | RESPIRATION RATE: 16 BRPM | DIASTOLIC BLOOD PRESSURE: 103 MMHG

## 2024-05-30 DIAGNOSIS — U07.1 COVID-19: ICD-10-CM

## 2024-05-30 DIAGNOSIS — K92.2 UPPER GI BLEED: ICD-10-CM

## 2024-05-30 LAB
CRP SERPL-MCNC: 35.26 MG/L
D DIMER PPP FEU-MCNC: 0.35 UG/ML FEU (ref 0–0.5)
HGB BLD-MCNC: 15.3 G/DL (ref 13.3–17.7)
UPPER GI ENDOSCOPY: NORMAL

## 2024-05-30 PROCEDURE — 43239 EGD BIOPSY SINGLE/MULTIPLE: CPT | Performed by: INTERNAL MEDICINE

## 2024-05-30 PROCEDURE — 258N000003 HC RX IP 258 OP 636: Performed by: INTERNAL MEDICINE

## 2024-05-30 PROCEDURE — 0DB68ZX EXCISION OF STOMACH, VIA NATURAL OR ARTIFICIAL OPENING ENDOSCOPIC, DIAGNOSTIC: ICD-10-PCS | Performed by: INTERNAL MEDICINE

## 2024-05-30 PROCEDURE — 250N000011 HC RX IP 250 OP 636: Performed by: EMERGENCY MEDICINE

## 2024-05-30 PROCEDURE — 36415 COLL VENOUS BLD VENIPUNCTURE: CPT | Performed by: INTERNAL MEDICINE

## 2024-05-30 PROCEDURE — 250N000013 HC RX MED GY IP 250 OP 250 PS 637: Performed by: INTERNAL MEDICINE

## 2024-05-30 PROCEDURE — C9113 INJ PANTOPRAZOLE SODIUM, VIA: HCPCS | Performed by: EMERGENCY MEDICINE

## 2024-05-30 PROCEDURE — 85018 HEMOGLOBIN: CPT | Performed by: INTERNAL MEDICINE

## 2024-05-30 PROCEDURE — 120N000001 HC R&B MED SURG/OB

## 2024-05-30 PROCEDURE — 88305 TISSUE EXAM BY PATHOLOGIST: CPT | Mod: 26 | Performed by: PATHOLOGY

## 2024-05-30 PROCEDURE — 250N000009 HC RX 250: Performed by: INTERNAL MEDICINE

## 2024-05-30 PROCEDURE — 999N000099 HC STATISTIC MODERATE SEDATION < 10 MIN: Performed by: INTERNAL MEDICINE

## 2024-05-30 PROCEDURE — 99235 HOSP IP/OBS SAME DATE MOD 70: CPT | Performed by: INTERNAL MEDICINE

## 2024-05-30 PROCEDURE — 88305 TISSUE EXAM BY PATHOLOGIST: CPT | Mod: TC | Performed by: INTERNAL MEDICINE

## 2024-05-30 PROCEDURE — XW0DXF5 INTRODUCTION OF OTHER NEW TECHNOLOGY THERAPEUTIC SUBSTANCE INTO MOUTH AND PHARYNX, EXTERNAL APPROACH, NEW TECHNOLOGY GROUP 5: ICD-10-PCS | Performed by: INTERNAL MEDICINE

## 2024-05-30 PROCEDURE — 0DB78ZX EXCISION OF STOMACH, PYLORUS, VIA NATURAL OR ARTIFICIAL OPENING ENDOSCOPIC, DIAGNOSTIC: ICD-10-PCS | Performed by: INTERNAL MEDICINE

## 2024-05-30 PROCEDURE — 250N000011 HC RX IP 250 OP 636: Performed by: INTERNAL MEDICINE

## 2024-05-30 PROCEDURE — C9113 INJ PANTOPRAZOLE SODIUM, VIA: HCPCS | Performed by: INTERNAL MEDICINE

## 2024-05-30 PROCEDURE — 99207 PR APP CREDIT; MD BILLING SHARED VISIT: CPT | Performed by: INTERNAL MEDICINE

## 2024-05-30 RX ORDER — LIDOCAINE 40 MG/G
CREAM TOPICAL
Status: DISCONTINUED | OUTPATIENT
Start: 2024-05-30 | End: 2024-05-30 | Stop reason: HOSPADM

## 2024-05-30 RX ORDER — ONDANSETRON 4 MG/1
4 TABLET, ORALLY DISINTEGRATING ORAL EVERY 6 HOURS PRN
Status: DISCONTINUED | OUTPATIENT
Start: 2024-05-30 | End: 2024-05-30 | Stop reason: HOSPADM

## 2024-05-30 RX ORDER — NALOXONE HYDROCHLORIDE 0.4 MG/ML
0.2 INJECTION, SOLUTION INTRAMUSCULAR; INTRAVENOUS; SUBCUTANEOUS
Status: DISCONTINUED | OUTPATIENT
Start: 2024-05-30 | End: 2024-05-30 | Stop reason: HOSPADM

## 2024-05-30 RX ORDER — NALOXONE HYDROCHLORIDE 0.4 MG/ML
0.4 INJECTION, SOLUTION INTRAMUSCULAR; INTRAVENOUS; SUBCUTANEOUS
Status: DISCONTINUED | OUTPATIENT
Start: 2024-05-30 | End: 2024-05-30 | Stop reason: HOSPADM

## 2024-05-30 RX ORDER — FOLIC ACID 1 MG/1
1 TABLET ORAL DAILY
Status: DISCONTINUED | OUTPATIENT
Start: 2024-05-30 | End: 2024-05-30 | Stop reason: HOSPADM

## 2024-05-30 RX ORDER — POLYETHYLENE GLYCOL 3350 17 G/17G
17 POWDER, FOR SOLUTION ORAL 2 TIMES DAILY PRN
Status: DISCONTINUED | OUTPATIENT
Start: 2024-05-30 | End: 2024-05-30 | Stop reason: HOSPADM

## 2024-05-30 RX ORDER — PANTOPRAZOLE SODIUM 40 MG/1
40 TABLET, DELAYED RELEASE ORAL DAILY
Qty: 30 TABLET | Refills: 0 | Status: SHIPPED | OUTPATIENT
Start: 2024-05-30

## 2024-05-30 RX ORDER — ACETAMINOPHEN 650 MG/1
650 SUPPOSITORY RECTAL EVERY 4 HOURS PRN
Status: DISCONTINUED | OUTPATIENT
Start: 2024-05-30 | End: 2024-05-30 | Stop reason: HOSPADM

## 2024-05-30 RX ORDER — ONDANSETRON 2 MG/ML
4 INJECTION INTRAMUSCULAR; INTRAVENOUS EVERY 6 HOURS PRN
Status: DISCONTINUED | OUTPATIENT
Start: 2024-05-30 | End: 2024-05-30 | Stop reason: HOSPADM

## 2024-05-30 RX ORDER — AMOXICILLIN 250 MG
1 CAPSULE ORAL 2 TIMES DAILY PRN
Status: DISCONTINUED | OUTPATIENT
Start: 2024-05-30 | End: 2024-05-30 | Stop reason: HOSPADM

## 2024-05-30 RX ORDER — FENTANYL CITRATE 50 UG/ML
INJECTION, SOLUTION INTRAMUSCULAR; INTRAVENOUS PRN
Status: DISCONTINUED | OUTPATIENT
Start: 2024-05-30 | End: 2024-05-30 | Stop reason: HOSPADM

## 2024-05-30 RX ORDER — SODIUM CHLORIDE, SODIUM LACTATE, POTASSIUM CHLORIDE, CALCIUM CHLORIDE 600; 310; 30; 20 MG/100ML; MG/100ML; MG/100ML; MG/100ML
INJECTION, SOLUTION INTRAVENOUS CONTINUOUS
Status: DISCONTINUED | OUTPATIENT
Start: 2024-05-30 | End: 2024-05-30 | Stop reason: HOSPADM

## 2024-05-30 RX ORDER — ACETAMINOPHEN 325 MG/1
650 TABLET ORAL EVERY 4 HOURS PRN
Status: DISCONTINUED | OUTPATIENT
Start: 2024-05-30 | End: 2024-05-30 | Stop reason: HOSPADM

## 2024-05-30 RX ORDER — CALCIUM CARBONATE 500 MG/1
1000 TABLET, CHEWABLE ORAL 4 TIMES DAILY PRN
Status: DISCONTINUED | OUTPATIENT
Start: 2024-05-30 | End: 2024-05-30 | Stop reason: HOSPADM

## 2024-05-30 RX ORDER — FLUMAZENIL 0.1 MG/ML
0.2 INJECTION, SOLUTION INTRAVENOUS
Status: DISCONTINUED | OUTPATIENT
Start: 2024-05-30 | End: 2024-05-30 | Stop reason: HOSPADM

## 2024-05-30 RX ORDER — AMOXICILLIN 250 MG
2 CAPSULE ORAL 2 TIMES DAILY PRN
Status: DISCONTINUED | OUTPATIENT
Start: 2024-05-30 | End: 2024-05-30 | Stop reason: HOSPADM

## 2024-05-30 RX ADMIN — Medication 50 MG: at 09:29

## 2024-05-30 RX ADMIN — SODIUM CHLORIDE, POTASSIUM CHLORIDE, SODIUM LACTATE AND CALCIUM CHLORIDE: 600; 310; 30; 20 INJECTION, SOLUTION INTRAVENOUS at 03:41

## 2024-05-30 RX ADMIN — NIRMATRELVIR AND RITONAVIR 3 TABLET: KIT at 09:35

## 2024-05-30 RX ADMIN — PANTOPRAZOLE SODIUM 80 MG: 40 INJECTION, POWDER, FOR SOLUTION INTRAVENOUS at 01:13

## 2024-05-30 RX ADMIN — PANTOPRAZOLE SODIUM 40 MG: 40 INJECTION, POWDER, FOR SOLUTION INTRAVENOUS at 09:30

## 2024-05-30 RX ADMIN — FOLIC ACID 1 MG: 1 TABLET ORAL at 09:29

## 2024-05-30 ASSESSMENT — ACTIVITIES OF DAILY LIVING (ADL)
ADLS_ACUITY_SCORE: 35

## 2024-05-30 NOTE — ED TRIAGE NOTES
Hematemesis x 2 today bright red and bright blood in stool x 3 today. Also having generalized weakness, chills and subjective fever at home. Took 600 mg of ibuprofen at 1630. Has been drinking liquor  for the last 9 days due to his birthday.     Triage Assessment (Adult)       Row Name 05/29/24 8240          Triage Assessment    Airway WDL WDL        Respiratory WDL    Respiratory WDL WDL        Skin Circulation/Temperature WDL    Skin Circulation/Temperature WDL WDL        Cardiac WDL    Cardiac WDL WDL        Peripheral/Neurovascular WDL    Peripheral Neurovascular WDL WDL        Cognitive/Neuro/Behavioral WDL    Cognitive/Neuro/Behavioral WDL WDL        [place] Coma Scale    Best Eye Response 4-->(E4) spontaneous     Best Motor Response 6-->(M6) obeys commands     Best Verbal Response 5-->(V5) oriented     [place] Coma Scale Score 15

## 2024-05-30 NOTE — DISCHARGE SUMMARY
[facility]    Hospitalist Discharge Summary       Date of Admission:  5/30/2024  Date of Discharge:  5/30/2024  Discharging Provider: Fernando Oliver MD      Discharge Diagnoses   Hematemesis due to esophageal ulcers  Esophageal stenosis  COVID-19    Follow-ups Needed After Discharge   Follow-up Appointments     Follow-up and recommended labs and tests       Follow up with primary care provider, [facility],   within 7 days for hospital follow- up.  No follow up labs or test are   needed.    - Follow up with GI clinic in about 2 months          Unresulted Labs Ordered in the Past 30 Days of this Admission       Date and Time Order Name Status Description    5/30/2024 10:35 AM Surgical Pathology Exam In process         These results will be followed up by GI    Hospital Course   Jim Park is a 35yo M with PMH alcohol abuse who presented with hematemesis and COVID-19. He was drinking heavily for his birthday in [facility] about 1 week PTA, then presented with hematemesis and dark stool. Underwent EGD on 5/30 which showed esophageal ulcers likely source of bleeding. Hgb remained stable in 15s. Started on PPI and stable to discharge home with outpatient follow up.  - PPI at discharge  - Establish care with PCP    COVID-19: Not vaccinated. Got sick around 5/28, he reports no prior history of COVID-19. Having some mild dyspnea at rest, CRP is elevated at 35. He is treated with Paxlovid. Has mild wheezing at discharge but on room air and breathing comfortably so monitor for now. He reports prior hx of smoking and occasionally smokes by bumming a cigarette from a friend.  - Recommended to mask  - Recommended tobacco cessation  - Paxlovid    Alcohol abuse: States used to drink heavily adriana around time of his brother's passing ~12 years ago. Now drinks once/ week. Was in treatment ~12 years ago. No hx withdrawal. Birthday celebration this month was an isolated peck. No  withdrawal noted this admission.  - Recommend to limit alcohol use     Clinically Significant Risk Factors Present on Admission                                    Consultations This Hospital Stay   GASTROENTEROLOGY IP CONSULT    Code Status   Full Code    Time Spent on this Encounter   I, Fernando Oliver, personally saw the patient today and spent approximately 35 minutes discharging this patient.       Fernando Oliver MD  [facility]  ______________________________________________________________________    Physical Exam   Vital Signs: Temp: 98  F (36.7  C) Temp src: Oral BP: (!) 135/92 Pulse: 75   Resp: 16 SpO2: 100 % O2 Device: None (Room air)    Weight: 0 lbs 0 oz    Constitutional: Male in NAD  Eyes: Nonicteric, normal ocular movements  HEENT: Normocephalic, atraumatic, oral mucosa moist  Respiratory: CTAB, no wheezing or crackles  Cardiovascular: RRR, normal S1/2, no m/r/g  GI: No organomegaly, normoactive bowel sounds, nontender, nondistended  Skin: No rashes  Musculoskeletal: Normal strength in UE and LE, moves all extremities  Neurologic: A&Ox3  Psychiatric: Appropriate affect and mood       Primary Care Physician   [facility]    Discharge Disposition   Discharged to home  Condition at discharge: Stable    Significant Results and Procedures   Most Recent 3 CBC's:  Recent Labs   Lab Test 05/30/24 0724 05/29/24 2130   WBC  --  10.3   HGB 15.3 15.9   MCV  --  90   PLT  --  207     Most Recent 3 BMP's:  Recent Labs   Lab Test 05/29/24 2130      POTASSIUM 3.7   CHLORIDE 100   CO2 21*   BUN 8.7   CR 0.85   ANIONGAP 15   TIRSO 9.2   *     Most Recent 2 LFT's:  Recent Labs   Lab Test 05/29/24 2130   AST 31   ALT 39   ALKPHOS 103   BILITOTAL 1.4*     Most Recent 3 INR's:No lab results found.  Most Recent 3 Troponin's:No lab results found.  Most Recent 3 BNP's:No lab results found.  Most Recent D-dimer:  Recent Labs   Lab Test 05/29/24 2130   DD 0.35      Most Recent Cholesterol Panel:No lab results found.    Results for orders placed or performed during the hospital encounter of 06/13/17   Wrist XR, G/E 3 views, right    Narrative    XR WRIST RT G/E 3 VW 6/13/2017 3:36 AM    History: lateral wrist pain.  hit on door.    Comparison: None.    Findings: PA, oblique, and lateral views of the right wrist. There is  normal bony alignment. No acute fracture or subluxation is identified.  Minimal soft tissue swelling.      Impression    Impression: No acute osseous abnormality.    I have personally reviewed the examination and initial interpretation  and I agree with the findings.    ROLAND BELTRÁN MD       Discharge Orders      Reason for your hospital stay    You were hospitalized for vomiting blood, you had esophageal ulcers seen on endoscopy. You also have COVID-19. You have been prescribed medications for both.     Follow-up and recommended labs and tests     Follow up with primary care provider, [facility], within 7 days for hospital follow- up.  No follow up labs or test are needed.    - Follow up with GI clinic in about 2 months     Activity    Your activity upon discharge: activity as tolerated     Diet    Follow this diet upon discharge:        Regular Diet Adult     Discharge Medications   Current Discharge Medication List        START taking these medications    Details   nirmatrelvir and ritonavir (PAXLOVID) 300 mg/100 mg therapy pack Take 3 tablets by mouth 2 times daily for 4 days  Qty: 24 tablet, Refills: 0    Comments: Date of symptom onset: 5/28; Risk criteria met: Yes; Weight >40 kg Yes; Renal fxn: normal;  Drug-Drug interactions reviewed & addressed: Yes  Associated Diagnoses: Upper GI bleed      pantoprazole (PROTONIX) 40 MG EC tablet Take 1 tablet (40 mg) by mouth daily  Qty: 30 tablet, Refills: 0    Associated Diagnoses: Upper GI bleed           Allergies   Allergies   Allergen Reactions    Trazodone Nausea and Vomiting

## 2024-05-30 NOTE — CONSULTS
[facility]  Gastroenterology Consultation         Jim Park  [address]  [place] [place] [facility] [address]  34 year old male    Admission Date/Time: 5/30/2024  Primary Care Provider: [facility]  Referring / Attending Physician:  Dr. Key    We were asked to see the patient in consultation by Dr. Key for evaluation of hematemesis.      CC: bloody vomit    HPI:  Jim Park is a 34 year old male who presents with flulike symptoms and hematemesis.  Patient reports celebrating his birthday this month. He was in [place] from 5/11-5/15 of partying.  He states he drank a lot.  He then returned and from the 16th-19th he continued to drink a lot.  He states he does have a history of heavy alcohol use but has only drank weekly over the last 12 years or so.  He states on approximately May 23 he had an episode of hematemesis. He also notes that the next day he thinks he had a dark stool.  He then was doing fine until 5/29.  He woke up and he felt very poorly, with a stuffy nose and then significant myalgias.  He went to his chiropractor and had an adjustment and was doing okay but then he started to feel very poorly again so he came to the ED.  He has no abdominal pain.  He has no history of hematemesis. In ED tachy, afebrile. TB 1.4. hgb 15.9.  Found to be COVID positive and also admitted for evaluation of hemetemesis.      ROS: A comprehensive ten point review of systems was negative aside from those in mentioned in the HPI.      PAST MED HX:  I have reviewed this patient's medical history and updated it with pertinent information if needed.   Past Medical History:   Diagnosis Date    PTSD (post-traumatic stress disorder)        MEDICATIONS:   None       ALLERGIES:   Allergies   Allergen Reactions    Trazodone Nausea and Vomiting       SOCIAL HISTORY:  Social History     Tobacco Use    Smoking status: Former     Current packs/day: 0.25     Types: Cigarettes   Substance  Use Topics    Alcohol use: Yes     Comment: Socially, once per week    Drug use: Yes     Types: Marijuana     Comment: Daily       FAMILY HISTORY:  No family history on file.    PHYSICAL EXAM:   Vital Signs with Ranges  Temp: 99  F (37.2  C) Temp src: Oral BP: 136/84 Pulse: 86   Resp: 16 SpO2: 98 % O2 Device: None (Room air)    No intake/output data recorded.      Constitutional: Alert, oriented to person, place, date, situation.  Cooperative, lying in bed in NAD.   Respiratory:  Lungs CTAB.  No crackles, wheezes, or rhonchi, no labored breathing.  Cardiovascular:  Heart RRR, no MRG, no edema.  GI:  Abdomen soft, NT/ND and with normoactive BS  Skin/Integumen:  Warm, dry, non-diaphoretic.  MSK: CMS x4 intact.      ADDITIONAL COMMENTS:   I reviewed the patient's new clinical lab test results.   Recent Labs   Lab Test 05/30/24 0724 05/29/24 2130   WBC  --  10.3   HGB 15.3 15.9   MCV  --  90   PLT  --  207     Recent Labs   Lab Test 05/29/24 2130   POTASSIUM 3.7   CHLORIDE 100   CO2 21*   BUN 8.7   ANIONGAP 15     Recent Labs   Lab Test 05/29/24 2130   ALBUMIN 4.2   BILITOTAL 1.4*   ALT 39   AST 31   LIPASE 23       I reviewed the patient's new imaging results.        CONSULTATION ASSESSMENT AND PLAN:    Jim Park is a 34 year old male admitted on 5/30/2024. He presents with flu sx     Hematemesis  Pt was in [place] for ~1 week and was drinking heavily (birthday celebration). 5/23 had hematemesis (has picture) and next day with dark stool. Yesterday w/ fevers and aches, had another episode of hematemesis. No abdominal pain. No hx GI bleed. In ED tachy, afebrile. TB 1.4. hgb 15.9.  Denies any hx of GI bleed.   --NPO  --IV fluids  --Serial Hgb, transfuse for Hgb <7  --pantoprazole 40 mg IV BID  --Will schedule for EGD today to identify source of bleeding.    Recent Labs   Lab 05/30/24 0724 05/29/24 2130   HGB 15.3 15.9          COVID infection  Not vaccinated. Sx of aches and rhinorrhea. O2 sats normal. Mildly  veroy. COVID positive.   - CRP 35, d-dimer wnl  --Mgmt per hospitalist team    Etoh use  Used to drink heavily adriana around time of his brother's passing ~12 years ago. Now drinks once/ week. Was in treatment ~12 years ago. No hx withdrawal. Birthday celebration this month was an isolated peck  - CIWA  - oral vitamins            [facility].  Office: [phone]  Cell: [phone] (Dr. Dial)  Cell: [phone](Ascencion Aquino PA-C)

## 2024-05-30 NOTE — PROGRESS NOTES
Pt has been discharged home with all personal belongings and meds- verbalized understanding of how to take meds. Pt's IV has been removed, ordered a ride home.

## 2024-05-30 NOTE — CARE PLAN
Pt to be discharging this evening. Going home on paxlovid and PPI per MD instructions.  Patient stated wanted to stay until 3-4pm.  Pharmacy to complete teaching on Paxlovid.  No further concerns at this time.

## 2024-05-30 NOTE — PROGRESS NOTES
RECEIVING UNIT ED HANDOFF REVIEW    ED Nurse Handoff Report was reviewed by: Lena Arora RN on May 30, 2024 at 5:23 AM

## 2024-05-30 NOTE — ED NOTES
[facility]  ED Nurse Handoff Report    ED Chief complaint: GI Bleeding      ED Diagnosis:   Final diagnoses:   None       Code Status: to be addressed    Allergies:   Allergies   Allergen Reactions    Trazodone Nausea and Vomiting       Patient Story: presented to ED due to hematemesis and bloody stool with flu symptoms.  Focused Assessment:  not distress,elevated BP,alert.    Treatments and/or interventions provided: iv access,labs.  Patient's response to treatments and/or interventions: tolerated    To be done/followed up on inpatient unit:  as per admission order-monitor hemoglobin    Does this patient have any cognitive concerns?:  none    Activity level - Baseline/Home:  Independent  Activity Level - Current:   Independent    Patient's Preferred language: English   Needed?: No    Isolation: COVID positive  Infection: COVID positive  and special precautions  Patient tested for COVID 19 prior to admission: YES  Bariatric?: No    Vital Signs:   Vitals:    05/29/24 2120 05/30/24 0025 05/30/24 0030   BP: (!) 156/92 (!) 181/100 (!) 160/101   Pulse: 91  97   Resp: 20  18   Temp: 99.4  F (37.4  C)  98.6  F (37  C)   TempSrc: Oral  Oral   SpO2: 99%  98%       Cardiac Rhythm:Cardiac Rhythm: Normal sinus rhythm    Was the PSS-3 completed:   Yes  What interventions are required if any?               Family Comments:   OBS brochure/video discussed/provided to patient/family: No              Name of person given brochure if not patient:               Relationship to patient:     For the majority of the shift this patient's behavior was Green.   Behavioral interventions performed were none.    ED NURSE PHONE NUMBER: [phone]

## 2024-05-30 NOTE — ED NOTES
IVF commenced-NPO instructed.  BP remained high but no neurological deficit,no headache,no blurry vision,able to stand up and walk around with steady gait.

## 2024-05-30 NOTE — PHARMACY-ADMISSION MEDICATION HISTORY
Pharmacist Admission Medication History    Admission medication history is complete. The information provided in this note is only as accurate as the sources available at the time of the update.    Information Source(s): Patient and CareEverywhere/SureScripts via phone    Pertinent Information:   Not taking any prescriptions or over-the-counter medications at this time.     Changes made to PTA medication list:  Added: None  Deleted: None  Changed: None    Allergies reviewed with patient and updates made in EHR: yes    Medication History Completed By: Jessica Casillas RPH 5/30/2024 12:23 PM    No outpatient medications have been marked as taking for the 5/30/24 encounter (Hospital Encounter).

## 2024-05-30 NOTE — ED PROVIDER NOTES
Emergency Department Note      History of Present Illness     Chief Complaint  GI Bleeding    HPI  Jim Park is a 34 year old male who reports to the ED for evaluation of GI bleeding. Patient states 1 week ago in [place] had a large volume of hematemesis. Next day he had dark tarry stool. He experienced improvement until yesterday when symptoms of rhinorrhea and aches occurred. Today one episode of hematemesis and bloody, watery diarrhea.     Independent Historian  None    Review of External Notes  Outside Ed note 2/16/24  Past Medical History   Medical History and Problem List  PTSD   Nicotine use disorder   Anxiety     Medications  Remeron   Aspirin 325 MG   Naprosyn   Robaxin  Oxycodone     Surgical History   No past surgical history on file.  Physical Exam   Patient Vitals for the past 24 hrs:   BP Temp Temp src Pulse Resp SpO2   05/30/24 0030 (!) 160/101 98.6  F (37  C) Oral 97 18 98 %   05/30/24 0025 (!) 181/100 -- -- -- -- --   05/29/24 2120 (!) 156/92 99.4  F (37.4  C) Oral 91 20 99 %     Physical Exam  General: Resting on the gurney, appears comfortable  Head:  The scalp, face, and head appear normal  Mouth/Throat: Mucus membranes are moist  CV:  Regular rate    Normal S1 and S2  No pathological murmur   Resp:  Breath sounds clear and equal bilaterally    Non-labored, no retractions or accessory muscle use    No coarseness    No wheezing   GI:  Abdomen is soft, no rigidity    No tenderness to palpation  MS:  Normal motor assessment of all extremities.    Good capillary refill noted.  Skin:  No rash or lesions noted.  Neuro:   Speech is normal and fluent. No apparent deficit.  Psych: Awake. Alert.  Normal affect.      Appropriate interactions.    Diagnostics   Lab Results   Labs Ordered and Resulted from Time of ED Arrival to Time of ED Departure   COMPREHENSIVE METABOLIC PANEL - Abnormal       Result Value    Sodium 136      Potassium 3.7      Carbon Dioxide (CO2) 21 (*)     Anion Gap 15      Urea  Nitrogen 8.7      Creatinine 0.85      GFR Estimate >90      Calcium 9.2      Chloride 100      Glucose 107 (*)     Alkaline Phosphatase 103      AST 31      ALT 39      Protein Total 7.9      Albumin 4.2      Bilirubin Total 1.4 (*)    CBC WITH PLATELETS AND DIFFERENTIAL - Abnormal    WBC Count 10.3      RBC Count 4.79      Hemoglobin 15.9      Hematocrit 43.2      MCV 90      MCH 33.2 (*)     MCHC 36.8 (*)     RDW 11.8      Platelet Count 207      % Neutrophils 76      % Lymphocytes 9      % Monocytes 12      % Eosinophils 3      % Basophils 0      % Immature Granulocytes 0      NRBCs per 100 WBC 0      Absolute Neutrophils 7.8      Absolute Lymphocytes 0.9      Absolute Monocytes 1.2      Absolute Eosinophils 0.3      Absolute Basophils 0.0      Absolute Immature Granulocytes 0.0      Absolute NRBCs 0.0     INFLUENZA A/B, RSV, & SARS-COV2 PCR - Abnormal    Influenza A PCR Negative      Influenza B PCR Negative      RSV PCR Negative      SARS CoV2 PCR Positive (*)    LIPASE - Normal    Lipase 23         ED Course    Medications Administered  Medications   lidocaine 1 % 0.1-1 mL (has no administration in time range)   lidocaine (LMX4) cream (has no administration in time range)   sodium chloride (PF) 0.9% PF flush 3 mL (3 mLs Intracatheter Not Given 5/30/24 4132)   sodium chloride (PF) 0.9% PF flush 3 mL (has no administration in time range)   senna-docusate (SENOKOT-S/PERICOLACE) 8.6-50 MG per tablet 1 tablet (has no administration in time range)     Or   senna-docusate (SENOKOT-S/PERICOLACE) 8.6-50 MG per tablet 2 tablet (has no administration in time range)   calcium carbonate (TUMS) chewable tablet 1,000 mg (has no administration in time range)   lactated ringers infusion ( Intravenous $New Bag 5/30/24 7503)   acetaminophen (TYLENOL) tablet 650 mg (has no administration in time range)     Or   acetaminophen (TYLENOL) Suppository 650 mg (has no administration in time range)   polyethylene glycol (MIRALAX) Packet  17 g (has no administration in time range)   ondansetron (ZOFRAN ODT) ODT tab 4 mg (has no administration in time range)     Or   ondansetron (ZOFRAN) injection 4 mg (has no administration in time range)   pantoprazole (PROTONIX) IV push injection 40 mg (has no administration in time range)   thiamine tablet 50 mg (has no administration in time range)   folic acid (FOLVITE) tablet 1 mg (has no administration in time range)   nirmatrelvir and ritonavir (PAXLOVID) 300 mg/100 mg therapy pack 3 tablet (has no administration in time range)   pantoprazole (PROTONIX) IV push injection 80 mg (80 mg Intravenous $Given 5/30/24 0113)       Procedures  Procedures     Discussion of Management  Admitting HospitalistShahid    Social Determinants of Health adding to complexity of care  None    ED Course  ED Course as of 05/30/24 0047   u May 30, 2024   0033 I obtained history and examined the patient as noted above.      Medical Decision Making / Diagnosis   DAVID Park is a 34 year old male presents for evaluation of  hemetemesis, melena, bright red blood per rectum. This occurred 1 week and ago and again prior to arrival. Upon arrival to the emergency department, the patient's vital signs showed no tachycardia and reassuring blood pressure. The patient's hemoglobin was 15.9. Therefore, transfusion was not initiated. The patient did remain hemodynamically stable while in the emergency department. The patient was discussed with the hospitalist for admission to telemetry, and will be discussed with GI by the hospitalist.     The patient will  most likely need upper GI, based on symptoms and history.     Patient does have COVID which may make endoscopy somewhat more difficult, however, given significant bleeding GI consult likely warranted.    Disposition:   The patient is admitted      Disposition  The patient was admitted to the hospital.     ICD-10 Codes:    ICD-10-CM    1. Upper GI bleed  K92.2       2. COVID-19   U07.1              Scribe Disclosure:  I, Karina Bay, am serving as a scribe at 12:43 AM on 5/30/2024 to document services personally performed by Edna Pickett MD based on my observations and the provider's statements to me.        Edna Pickett MD  05/30/24 0816

## 2024-05-30 NOTE — H&P
[facility]    History and Physical - Hospitalist Service       Date of Admission:  5/30/2024    Assessment & Plan      Jim Park is a 34 year old male admitted on 5/30/2024. He presents with flu sx    Hematemesis  Pt was in [place] for ~1 week and was drinking heavily (birthday celebration). 5/23 had hematemesis (has picture) and next day with dark stool. Yesterday w/ fevers and aches, had another episode of hematemesis. No abdominal pain. No hx GI bleed. In ED tachy, afebrile. TB 1.4. hgb 15.9.   - NPO, IV fluids  - q6 hgb x 4  - pantoprazole 40 mg IV BID  - GI consult    COVID infection  Not vaccinated. Sx of aches and rhinorrhea. O2 sats normal. Mildly tachy. COVID positive.   - paxlovid   - CRP, d-dimer  - supplemental O2 prn    Etoh use  States used to drink heavily adriana around time of his brother's passing ~12 years ago. Now drinks once/ week. Was in treatment ~12 years ago. No hx withdrawal. Birthday celebration this month was an isolated peck  - CIWA  - oral vitamins    PTSD  - not currently on meds for this        Diet:  NPO, IV fluids  DVT Prophylaxis: Ambulate every shift  Staples Catheter: Not present  Lines: None     Cardiac Monitoring: None  Code Status:  Full    Clinically Significant Risk Factors Present on Admission                                  Disposition Plan     Medically Ready for Discharge: Anticipated in 2-4 Days           Vikram Key MD  Hospitalist Service  [facility]  Securely message with Diane (more info)  Text page via [facility] Paging/Directory     ______________________________________________________________________    Chief Complaint   Hematemesis, covid    History is obtained from the patient, electronic health record, and emergency department physician    History of Present Illness   Jim Park is a 34 year old male who presents with flulike symptoms and hematemesis.  Patient reports celebrating his birthday this  month.  He was in [place] from May 11 to 15 of partying.  He states he drank a lot.  He then returned and from the 16th-19th he continues to drink a lot.  He states he does have a history of heavy alcohol use but has only drank weekly over the last 12 years or so.  He states on approximately May 23 he had an episode of hematemesis.  He took a picture of it and there was evidence of red substance but it is on black concrete so is difficult to determine how much.  He also notes that the next day he thinks he had a dark stool.  He then was doing fine until today.  He states he woke up and he felt very poorly.  He had a stuffy nose and then significant myalgias.  He went to his chiropractor and had an adjustment and was doing okay but then this evening he started to feel very poorly again so he came to the emergency department.  He has no abdominal pain.  He has no history of hematemesis.  He has noted some subjective fevers and feeling hot and cold.  He is not vaccinated for COVID.  He states he got chest pain with his myalgias.  He did not need shortness of breath.      Past Medical History    Past Medical History:   Diagnosis Date    PTSD (post-traumatic stress disorder)        Past Surgical History   No past surgical history on file.    Prior to Admission Medications   None        Review of Systems    The 10 point Review of Systems is negative other than noted in the HPI or here.      Social History     Socioeconomic History    Marital status: Single     Spouse name: Not on file    Number of children: Not on file    Years of education: Not on file    Highest education level: Not on file   Occupational History    Not on file   Tobacco Use    Smoking status: Former     Current packs/day: 0.25     Types: Cigarettes    Smokeless tobacco: Not on file   Substance and Sexual Activity    Alcohol use: Yes     Comment: Socially, once per week    Drug use: Yes     Types: Marijuana     Comment: Daily    Sexual activity: Not  on file   Other Topics Concern    Not on file   Social History Narrative    Not on file     Social Determinants of Health     Financial Resource Strain: Not on file   Food Insecurity: Not on file   Transportation Needs: Not on file   Physical Activity: Not on file   Stress: Not on file   Social Connections: Not on file   Interpersonal Safety: Not on file   Housing Stability: Not on file        Physical Exam   Vital Signs: Temp: 98.6  F (37  C) Temp src: Oral BP: (!) 180/113 Pulse: 94   Resp: 15 SpO2: 96 % O2 Device: None (Room air)    Weight: 0 lbs 0 oz    General Appearance: Alert, very pleasant, no distress  Respiratory: CTA B  Cardiovascular: tachy, regular. No murmur. No edema  GI: soft, nt/nd  Skin: no rashes or lesions grossly    Other: CN grossly intact, RODGERS      Medical Decision Making       60 MINUTES SPENT BY ME on the date of service doing chart review, history, exam, documentation & further activities per the note.      Data   ------------------------- PAST 24 HR DATA REVIEWED -----------------------------------------------    I have personally reviewed the following data over the past 24 hrs:    10.3  \   15.9   / 207     136 100 8.7 /  107 (H)   3.7 21 (L) 0.85 \     ALT: 39 AST: 31 AP: 103 TBILI: 1.4 (H)   ALB: 4.2 TOT PROTEIN: 7.9 LIPASE: 23       Imaging results reviewed over the past 24 hrs:   No results found for this or any previous visit (from the past 24 hour(s)).

## 2024-05-31 LAB
PATH REPORT.COMMENTS IMP SPEC: NORMAL
PATH REPORT.COMMENTS IMP SPEC: NORMAL
PATH REPORT.FINAL DX SPEC: NORMAL
PATH REPORT.GROSS SPEC: NORMAL
PATH REPORT.MICROSCOPIC SPEC OTHER STN: NORMAL
PATH REPORT.RELEVANT HX SPEC: NORMAL
PHOTO IMAGE: NORMAL

## 2025-05-20 ENCOUNTER — OFFICE VISIT (OUTPATIENT)
Dept: URGENT CARE | Facility: URGENT CARE | Age: 35
End: 2025-05-20
Payer: COMMERCIAL

## 2025-05-20 VITALS
RESPIRATION RATE: 18 BRPM | DIASTOLIC BLOOD PRESSURE: 90 MMHG | TEMPERATURE: 98 F | OXYGEN SATURATION: 99 % | SYSTOLIC BLOOD PRESSURE: 142 MMHG | WEIGHT: 232 LBS | BODY MASS INDEX: 34.26 KG/M2 | HEART RATE: 91 BPM

## 2025-05-20 DIAGNOSIS — D49.2 ABNORMAL SKIN GROWTH: Primary | ICD-10-CM

## 2025-05-20 PROCEDURE — 99203 OFFICE O/P NEW LOW 30 MIN: CPT | Performed by: FAMILY MEDICINE

## 2025-05-21 ENCOUNTER — TELEPHONE (OUTPATIENT)
Dept: SURGERY | Facility: CLINIC | Age: 35
End: 2025-05-21
Payer: COMMERCIAL

## 2025-05-21 NOTE — TELEPHONE ENCOUNTER
05/21/25    8:18 AM     Spoke with the patient and he reports that the skin lesion is located on his buttock, not the crease/tailbone. Consult arranged 5/30 at 0900 with Dr. Colon.

## 2025-05-21 NOTE — TELEPHONE ENCOUNTER
REFERRAL INFORMATION:  Referring Provider:  Flakita Rice MD   Referring Clinic:  [facility]   Reason for Visit/Diagnosis: D49.2 (ICD-10-CM) - Abnormal skin growth        FUTURE VISIT INFORMATION:  Appointment Date: 5/30/25  Appointment Time:      NOTES RECORD STATUS  DETAILS   OFFICE NOTE from Referring Provider Internal 5/20/25   OFFICE NOTE from Other Specialists N/A    HOSPITAL DISCHARGE SUMMARY/ ED VISITS  N/A    OPERATIVE REPORT N/A    ENDOSCOPY (EGD)  Internal 5/30/24   PERTINENT LABS Internal    PATHOLOGY REPORTS (RELATED) Internal EGD 5/30/24   IMAGING (CT, MRI, US, XR)  N/A      Records Requested    Facility    Fax:    Outcome

## 2025-05-21 NOTE — TELEPHONE ENCOUNTER
[facility] Call Center    Phone Message    May a detailed message be left on voicemail: yes     Reason for Call: Other: New patient //  DX SKIN GROWTH IN THE LEFT BUTTOCK AWAY FROM BUTTOCK CREASE  // DX not in scheduling guidelines, please call patient to schedule    Action Taken: Message routed to:  [facility] Gen Surg    Travel Screening: Not Applicable     Date of Service:

## 2025-05-21 NOTE — PROGRESS NOTES
Chief Complaint   Patient presents with    Urgent Care     - 6 Mos   - Primordial Cyst  - Very Painful      Jim was seen today for urgent care.    Diagnoses and all orders for this visit:    Abnormal skin growth  -     Adult Gen Surg  Referral; Future    D/d  Wart, inflamed cyst, viral wart,std infection     PLAN:  No sign of infection noted no antibiotic recommended  Advised patient not to touch the area  If there is any worsening pain or redness should follow-up.  Patient was referred to surgery for removal of the lesion.    See orders in Epic    SUBJECTIVE:   Jim Park is a 35 year old male presenting with a chief complaint of skin lesion in the left buttock area adjacent  to the butt crease.  He has this for many months started as a blister and then it eventually got bigger.  As per patient sometimes there is some pus draining from the area currently nontender he does have a tendency of picking on it.  Denies any similar lesion elsewhere denies any abnormal penile discharge.  Patient currently not sexually active.    Past Medical History:   Diagnosis Date    PTSD (post-traumatic stress disorder)      Current Outpatient Medications   Medication Sig Dispense Refill    pantoprazole (PROTONIX) 40 MG EC tablet Take 1 tablet (40 mg) by mouth daily 30 tablet 0     Social History     Tobacco Use    Smoking status: Former     Current packs/day: 0.25     Types: Cigarettes    Smokeless tobacco: Not on file   Substance Use Topics    Alcohol use: Yes     Comment: Socially, once per week       ROS:  Review of systems negative except as stated above.    OBJECTIVE:  BP (!) 142/90   Pulse 91   Temp 98  F (36.7  C) (Oral)   Resp 18   Wt 105.2 kg (232 lb)   SpO2 99%   BMI 34.26 kg/m    GENERAL APPEARANCE: healthy, alert and no distress  SKIN: left buttock-there is a 1 cm rubbery elongated light brown-colored raised skin lesion comprised of 3 individual bumps joint together with no surrounding redness located  on the left buttock cheek adjacent to the buttock crease  PSYCH: mentation appears normal    Flakita Rice MD

## 2025-05-29 ENCOUNTER — PRE VISIT (OUTPATIENT)
Dept: SURGERY | Facility: CLINIC | Age: 35
End: 2025-05-29
Payer: COMMERCIAL

## 2025-05-29 NOTE — TELEPHONE ENCOUNTER
Patient Telephone Reminder Call    Date of call:  05/29/25  Phone numbers:  Home number on file [phone] (home)    Reached patient/confirmed appointment:  Yes  Appointment with:   Dr. Josh Colon  Reason for visit:  Skin lesion  Remind patient that this visit is a consultation only, NO procedure:  Yes  Can this visit be changed to a video visit:  No

## 2025-05-30 ENCOUNTER — PRE VISIT (OUTPATIENT)
Dept: SURGERY | Facility: CLINIC | Age: 35
End: 2025-05-30

## 2025-06-30 ENCOUNTER — HOSPITAL ENCOUNTER (EMERGENCY)
Facility: CLINIC | Age: 35
Discharge: HOME | End: 2025-06-30
Attending: EMERGENCY MEDICINE | Admitting: EMERGENCY MEDICINE
Payer: COMMERCIAL

## 2025-06-30 VITALS
TEMPERATURE: 98.4 F | WEIGHT: 224.2 LBS | HEIGHT: 68 IN | HEART RATE: 80 BPM | RESPIRATION RATE: 16 BRPM | OXYGEN SATURATION: 98 % | BODY MASS INDEX: 33.98 KG/M2 | SYSTOLIC BLOOD PRESSURE: 154 MMHG | DIASTOLIC BLOOD PRESSURE: 109 MMHG

## 2025-06-30 DIAGNOSIS — S61.214A LACERATION OF RIGHT RING FINGER WITHOUT FOREIGN BODY WITHOUT DAMAGE TO NAIL, INITIAL ENCOUNTER: ICD-10-CM

## 2025-06-30 PROCEDURE — 99283 EMERGENCY DEPT VISIT LOW MDM: CPT | Performed by: EMERGENCY MEDICINE

## 2025-06-30 PROCEDURE — 250N000013 HC RX MED GY IP 250 OP 250 PS 637: Performed by: EMERGENCY MEDICINE

## 2025-06-30 RX ORDER — IBUPROFEN 600 MG/1
600 TABLET, FILM COATED ORAL ONCE
Status: COMPLETED | OUTPATIENT
Start: 2025-06-30 | End: 2025-06-30

## 2025-06-30 RX ADMIN — IBUPROFEN 600 MG: 600 TABLET ORAL at 21:47

## 2025-06-30 ASSESSMENT — COLUMBIA-SUICIDE SEVERITY RATING SCALE - C-SSRS
6. HAVE YOU EVER DONE ANYTHING, STARTED TO DO ANYTHING, OR PREPARED TO DO ANYTHING TO END YOUR LIFE?: NO
1. IN THE PAST MONTH, HAVE YOU WISHED YOU WERE DEAD OR WISHED YOU COULD GO TO SLEEP AND NOT WAKE UP?: NO
2. HAVE YOU ACTUALLY HAD ANY THOUGHTS OF KILLING YOURSELF IN THE PAST MONTH?: NO

## 2025-06-30 ASSESSMENT — ACTIVITIES OF DAILY LIVING (ADL): ADLS_ACUITY_SCORE: 46

## 2025-07-01 NOTE — ED PROVIDER NOTES
"    [facility] EMERGENCY DEPARTMENT ([facility])    6/30/25       ED PROVIDER NOTE  History     Chief Complaint   Patient presents with    Laceration     Hand       HPI  Jim Park is a 35 year old male who presents to the ED for evaluation of right hand laceration. Patient reports he cut his right pinky and ring finger on a bumper yesterday. Patient reports numbness and tingling in finger. Patient's last tetanus shot was 2019.     Past Medical History  Past Medical History:   Diagnosis Date    PTSD (post-traumatic stress disorder)      Past Surgical History:   Procedure Laterality Date    ESOPHAGOSCOPY, GASTROSCOPY, DUODENOSCOPY (EGD), COMBINED N/A 5/30/2024    Procedure: ESOPHAGOGASTRODUODENOSCOPY, WITH BIOPSY;  Surgeon: Carlos Manuel Dial MD;  Location: [facility]     No current outpatient medications on file.    Allergies   Allergen Reactions    Trazodone Nausea and Vomiting     Family History  History reviewed. No pertinent family history.  Social History   Social History     Tobacco Use    Smoking status: Former     Current packs/day: 0.25     Types: Cigarettes, Vaping Device   Substance Use Topics    Alcohol use: Yes     Comment: Socially, once per week    Drug use: Yes     Types: Marijuana     Comment: Daily      A complete review of systems was performed with pertinent positives and negatives noted in the HPI, and all other systems negative.    Physical Exam   BP: (!) 158/114  Pulse: 80  Temp: 98.4  F (36.9  C)  Resp: 16  Height: 172.7 cm (5' 8\")  Weight: 101.7 kg (224 lb 3.2 oz)  SpO2: 98 %  Physical Exam  Vitals and nursing note reviewed.   Constitutional:       General: He is not in acute distress.     Appearance: Normal appearance. He is well-developed. He is not ill-appearing or diaphoretic.   HENT:      Head: Normocephalic and atraumatic.      Nose: Nose normal.      Mouth/Throat:      Mouth: Mucous membranes are moist.   Eyes:      General: No scleral icterus.     Conjunctiva/sclera: " Conjunctivae normal.   Cardiovascular:      Rate and Rhythm: Normal rate.   Pulmonary:      Effort: Pulmonary effort is normal. No respiratory distress.      Breath sounds: No stridor.   Abdominal:      General: There is no distension.   Musculoskeletal:         General: Signs of injury present. No swelling or deformity. Normal range of motion.      Right hand: Laceration present. No swelling, deformity or bony tenderness. Normal range of motion.        Hands:       Cervical back: Normal range of motion and neck supple. No rigidity.      Comments: Superficial laceration over the volar surface of the right small finger.  Slightly deeper laceration over the flexor all joints of the right ring finger MCP without active bleeding or surrounding erythema or swelling.  No vascular injury or tendon involvement.  Normal range of motion.     Skin:     General: Skin is warm and dry.      Coloration: Skin is not jaundiced or pale.      Findings: No erythema or rash.   Neurological:      General: No focal deficit present.      Mental Status: He is alert and oriented to person, place, and time.   Psychiatric:         Mood and Affect: Mood normal.         Behavior: Behavior normal.         Thought Content: Thought content normal.           ED Course, Procedures, & Data      Procedures                   Medications   ibuprofen (ADVIL/MOTRIN) tablet 600 mg (600 mg Oral $Given 6/30/25 7330)              Assessment & Plan    Patient with delayed presentation for evaluation of a laceration of his right ring finger on the flexural surface overlying the MCP joint.  The injury is not deep enough to cause any nerve, tendon, or vascular damage.  No evidence of acute infection.  No active bleeding.  I think the risk of wound infection outweighs the benefit of closing the wound since there is no active bleeding.  Will irrigate the wound thoroughly and dressed with bacitracin ointment and a Band-Aid.  Discussed wound care at home with  continuous application of bacitracin and a Band-Aid.  Advised minimizing movements of the ring finger along this joint until improved healing.  Patient denies significant kota contamination of the wound.  His tetanus vaccination is up-to-date within the past 6 years so I do not think a booster is required.  Discussed wound care and return precautions.    I have reviewed the nursing notes. I have reviewed the findings, diagnosis, plan and need for follow up with the patient.    New Prescriptions    No medications on file       Final diagnoses:   Laceration of right ring finger without foreign body without damage to nail, initial encounter       I, Nnamdi Kerr, am serving as a trained medical scribe to document services personally performed by Nat Castillo MD based on the provider's statements to me on June 30, 2025.  This document has been checked and approved by the attending provider.    INat MD, was physically present and have reviewed and verified the accuracy of this note documented by Nnamdi Kerr medical scribe.      Nat Castillo MD  [facility] EMERGENCY DEPARTMENT  6/30/2025     Nat Castillo MD  06/30/25 8449

## 2025-07-01 NOTE — ED TRIAGE NOTES
Pt here for finger laceration to the right ring finger.    This occurred yesterday evening.  Pt was trying to fix his car and pull out his bumper when he cut his hand.    Last tetanus shot is unknown.    Pain currently 6/10.  Pt has not taken any pain medication.    Pt notes numbness/tingling to the finger.    
Her/She

## 2025-07-01 NOTE — DISCHARGE INSTRUCTIONS
Keep wound clean and dry. Apply bacitracin or Vaseline ointment to wound to help with healing.     Check wound daily for signs of infection such as increased swelling, redness, drainage, pain, or odor. Return to the emergency department if you have concerns for wound infection.

## 2025-07-05 ENCOUNTER — HOSPITAL ENCOUNTER (EMERGENCY)
Facility: CLINIC | Age: 35
Discharge: HOME | End: 2025-07-05
Attending: EMERGENCY MEDICINE | Admitting: EMERGENCY MEDICINE
Payer: COMMERCIAL

## 2025-07-05 VITALS
OXYGEN SATURATION: 99 % | DIASTOLIC BLOOD PRESSURE: 104 MMHG | RESPIRATION RATE: 18 BRPM | HEART RATE: 72 BPM | SYSTOLIC BLOOD PRESSURE: 157 MMHG | TEMPERATURE: 98.5 F

## 2025-07-05 DIAGNOSIS — L73.2 HIDRADENITIS SUPPURATIVA: ICD-10-CM

## 2025-07-05 LAB
ALBUMIN SERPL BCG-MCNC: 4.2 G/DL (ref 3.5–5.2)
ALP SERPL-CCNC: 108 U/L (ref 40–150)
ALT SERPL W P-5'-P-CCNC: 20 U/L (ref 0–70)
ANION GAP SERPL CALCULATED.3IONS-SCNC: 10 MMOL/L (ref 7–15)
AST SERPL W P-5'-P-CCNC: 19 U/L (ref 0–45)
BASOPHILS # BLD AUTO: 0 10E3/UL (ref 0–0.2)
BASOPHILS NFR BLD AUTO: 1 %
BILIRUB SERPL-MCNC: 1.2 MG/DL
BUN SERPL-MCNC: 7.4 MG/DL (ref 6–20)
CALCIUM SERPL-MCNC: 9.4 MG/DL (ref 8.8–10.4)
CHLORIDE SERPL-SCNC: 104 MMOL/L (ref 98–107)
CREAT SERPL-MCNC: 0.86 MG/DL (ref 0.67–1.17)
CRP SERPL-MCNC: 25 MG/L
EGFRCR SERPLBLD CKD-EPI 2021: >90 ML/MIN/1.73M2
EOSINOPHIL # BLD AUTO: 0.2 10E3/UL (ref 0–0.7)
EOSINOPHIL NFR BLD AUTO: 2 %
ERYTHROCYTE [DISTWIDTH] IN BLOOD BY AUTOMATED COUNT: 11.8 % (ref 10–15)
ERYTHROCYTE [SEDIMENTATION RATE] IN BLOOD BY WESTERGREN METHOD: 17 MM/HR (ref 0–15)
GLUCOSE SERPL-MCNC: 107 MG/DL (ref 70–99)
HCO3 SERPL-SCNC: 24 MMOL/L (ref 22–29)
HCT VFR BLD AUTO: 45.2 % (ref 40–53)
HGB BLD-MCNC: 16.5 G/DL (ref 13.3–17.7)
IMM GRANULOCYTES # BLD: 0 10E3/UL
IMM GRANULOCYTES NFR BLD: 0 %
LYMPHOCYTES # BLD AUTO: 1.4 10E3/UL (ref 0.8–5.3)
LYMPHOCYTES NFR BLD AUTO: 17 %
MCH RBC QN AUTO: 32.1 PG (ref 26.5–33)
MCHC RBC AUTO-ENTMCNC: 36.5 G/DL (ref 31.5–36.5)
MCV RBC AUTO: 88 FL (ref 78–100)
MONOCYTES # BLD AUTO: 0.9 10E3/UL (ref 0–1.3)
MONOCYTES NFR BLD AUTO: 10 %
NEUTROPHILS # BLD AUTO: 6 10E3/UL (ref 1.6–8.3)
NEUTROPHILS NFR BLD AUTO: 70 %
NRBC # BLD AUTO: 0 10E3/UL
NRBC BLD AUTO-RTO: 0 /100
PLATELET # BLD AUTO: 246 10E3/UL (ref 150–450)
POTASSIUM SERPL-SCNC: 3.8 MMOL/L (ref 3.4–5.3)
PROT SERPL-MCNC: 8 G/DL (ref 6.4–8.3)
RBC # BLD AUTO: 5.14 10E6/UL (ref 4.4–5.9)
SODIUM SERPL-SCNC: 138 MMOL/L (ref 135–145)
WBC # BLD AUTO: 8.5 10E3/UL (ref 4–11)

## 2025-07-05 PROCEDURE — 10061 I&D ABSCESS COMP/MULTIPLE: CPT | Performed by: EMERGENCY MEDICINE

## 2025-07-05 PROCEDURE — 87070 CULTURE OTHR SPECIMN AEROBIC: CPT | Performed by: EMERGENCY MEDICINE

## 2025-07-05 PROCEDURE — 85652 RBC SED RATE AUTOMATED: CPT | Performed by: EMERGENCY MEDICINE

## 2025-07-05 PROCEDURE — 85004 AUTOMATED DIFF WBC COUNT: CPT | Performed by: EMERGENCY MEDICINE

## 2025-07-05 PROCEDURE — 86140 C-REACTIVE PROTEIN: CPT | Performed by: EMERGENCY MEDICINE

## 2025-07-05 PROCEDURE — 99284 EMERGENCY DEPT VISIT MOD MDM: CPT | Mod: 25 | Performed by: EMERGENCY MEDICINE

## 2025-07-05 PROCEDURE — 82247 BILIRUBIN TOTAL: CPT | Performed by: EMERGENCY MEDICINE

## 2025-07-05 PROCEDURE — 250N000009 HC RX 250: Performed by: EMERGENCY MEDICINE

## 2025-07-05 PROCEDURE — 36415 COLL VENOUS BLD VENIPUNCTURE: CPT | Performed by: EMERGENCY MEDICINE

## 2025-07-05 RX ORDER — SULFAMETHOXAZOLE AND TRIMETHOPRIM 800; 160 MG/1; MG/1
1 TABLET ORAL 2 TIMES DAILY
Qty: 10 TABLET | Refills: 0 | Status: SHIPPED | OUTPATIENT
Start: 2025-07-05 | End: 2025-07-10

## 2025-07-05 RX ADMIN — LIDOCAINE HYDROCHLORIDE 10 ML: 10 INJECTION, SOLUTION EPIDURAL; INFILTRATION; INTRACAUDAL; PERINEURAL at 21:24

## 2025-07-05 ASSESSMENT — ACTIVITIES OF DAILY LIVING (ADL)
ADLS_ACUITY_SCORE: 46

## 2025-07-05 NOTE — ED TRIAGE NOTES
"Patient ambulatory to triage with c/o \"bumps on tailbone and buttocks\". Patient states in the past they have drained on their own but now it is not draining. Patient denies fevers.      Triage Assessment (Adult)       Row Name 07/05/25 8469          Triage Assessment    Airway WDL WDL        Respiratory WDL    Respiratory WDL WDL        Skin Circulation/Temperature WDL    Skin Circulation/Temperature WDL X;all  reports skin bumps on tailbone        Cardiac WDL    Cardiac WDL WDL        Peripheral/Neurovascular WDL    Peripheral Neurovascular WDL WDL        Cognitive/Neuro/Behavioral WDL    Cognitive/Neuro/Behavioral WDL WDL        [place] Coma Scale    Best Eye Response 4-->(E4) spontaneous     Best Motor Response 6-->(M6) obeys commands     Best Verbal Response 5-->(V5) oriented     [place] Coma Scale Score 15                     "

## 2025-07-05 NOTE — ED PROVIDER NOTES
"    [place] [facility] ([facility])    7/05/25         History     Chief Complaint   Patient presents with    Derm Problem     HPI  Jim Park is a 35 year old male who with PMH alcohol abuse who presented with hematemesis and COVID-19 presents to the ED due to dermatology related issue. Patient reports \"bumps on his tailbone and buttocks\" on R and L side. He reports presenting to  about a month ago and was sent to surgery in which he missed his appointment. He has only every had these \"bumps\" on his left side before which has drained on its own. Now within the last few days, he has noticed the same lesion on his R buttocks. His left side was filled with pus and this morning was draining itself although both are draining. He denies fever, and pain is a 6-7/10, he took tylenol last night. Denies rapid spreading, redness, or swelling, it is localized to one spot. He has tried using a warm bowl of water on the wounds. He denies history of diabetes, MRSA, Hidradenitis Suppurativa, and family history of a similar dermological problem.     As per Epic review:   Patient presented to the ED due to laceration of right ring finger without foreign body without damage to nail on 6/30/25. The injury was not deep enough to cause any nerve, tendon, or vascular damage.  Irrigated wound thoroughly and dressed with bacitracin ointment and a Band-Aid.  Discussed wound care at home with continuous application of bacitracin and a Band-Aid.      Seen in  for L buttock lesion on 5/20/25  No sign of infection noted no antibiotic recommended  Advised patient not to touch the area  If there is any worsening pain or redness should follow-up.  Patient was referred to surgery for removal of the lesion.     Past Medical History  Past Medical History:   Diagnosis Date    PTSD (post-traumatic stress disorder)      Past Surgical History:   Procedure Laterality Date    ESOPHAGOSCOPY, GASTROSCOPY, DUODENOSCOPY (EGD), " COMBINED N/A 5/30/2024    Procedure: ESOPHAGOGASTRODUODENOSCOPY, WITH BIOPSY;  Surgeon: Carlos Manuel Dial MD;  Location:  GI     No current outpatient medications on file.    Allergies   Allergen Reactions    Trazodone Nausea and Vomiting     Family History  No family history on file.  Social History   Social History     Tobacco Use    Smoking status: Former     Current packs/day: 0.25     Types: Cigarettes, Vaping Device   Substance Use Topics    Alcohol use: Yes     Comment: Socially, once per week    Drug use: Yes     Types: Marijuana     Comment: Daily      Past medical history, past surgical history, medications, allergies, family history, and social history were reviewed with the patient. No additional pertinent items.   A complete review of systems was performed with pertinent positives and negatives noted in the HPI, and all other systems negative.    Physical Exam   BP: (!) 160/109  Pulse: 82  Temp: 98.4  F (36.9  C)  Resp: 18  SpO2: 98 %  Physical Exam  General:  No acute distress.  HENT:  Normocephalic and atraumatic.   Eyes: EOMI. Conjunctivae normal.   Cardiovascular: Normal rate and regular rhythm.  Normal heart sounds. No murmur heard.  Pulmonary:  No respiratory distress. Normal breath sounds.   Abdominal: There is no distension.  Abdomen is soft. There is no mass.  There is no abdominal tenderness.   Musculoskeletal: No midline spinal tenderness.  Normal gluteal musculature with gluteal cleft with multiple small areas of firm induration, different size masses.  Some firm and some fluctuant.  Not actively draining.  Warm and tender.  No surroudning erythema.   Moving all extremities spontaneously.    Skin: Warm and dry  Neurological: No focal deficit present.   Mood and Affect: Mood normal.     Picture after I&D        ED Course, Procedures, & Data      [facility]    PROCEDURE: -Incision/Drainage    Date/Time: 7/6/2025 4:37 AM    Performed by: Jarret  DO Ghazala  Authorized by: Ghazala Wheat DO    Risks, benefits and alternatives discussed.      LOCATION:      Type:  Abscess    Location:  Anogenital    Anogenital location:  Gluteal cleft    PROCEDURE TYPE:     Complexity:  Simple    ANESTHESIA (see MAR for exact dosages):     Anesthesia method:  Local infiltration    Local anesthetic:  Lidocaine 1% w/o epi    PROCEDURE DETAILS:     Incision types:  Stab incision    Incision depth:  Subcutaneous    Scalpel blade:  11    Wound management:  Extensive cleaning    Drainage:  Purulent and bloody    Drainage amount: 1cc.    Wound treatment:  Wound left open    Packing materials:  None    PROCEDURE  Describe Procedure: Dressed with non-adherent dressing                     Medications - No data to display       Critical care was not performed.     Medical Decision Making  The patient's presentation was of moderate complexity (an acute complicated injury).    The patient's evaluation involved:  ordering and/or review of 3+ test(s) in this encounter (see separate area of note for details)    The patient's management necessitated moderate risk (prescription drug management including medications given in the ED).    Assessment & Plan    Patient presents for firm tender nodules on right left side of upper gluteal cleft, states this is a new issue and has never seen dermatology or general surgery.  Denies current infectious symptoms however pain and swelling are resulting in difficulty sitting down.  On exam, he is afebrile.  Normal rectum.  patient with multiple small firm and fluctuant masses of gluteal cleft, tender.  POCUS reveals multiple abscesses ranging from scant to 1-1.5 in diameter.  Patient agreeable to I&D of two larges lesion on the left.  Multiple small lesions left alone.  Discussed supportive care and sitz baths.    Culture obtained.  No leukocytosis.  CRP 25.  Patient given bactrim as he notes this is worsening in pain and swelling.  No concern for  cellulitis at this time.  New referral to general surgery placed for hydradenitis suppurative, patient has a history of lesions on axilla.  Discussed hygiene and supportive care.  Discharged with return precautions.    I have reviewed the nursing notes. I have reviewed the findings, diagnosis, plan and need for follow up with the patient.    New Prescriptions    No medications on file       Final diagnoses:   None       I, Miroslava Fonseca, am serving as a trained medical scribe to document services personally performed by Ghazala Wheat DO based on the provider's statements to me on July 5, 2025.  This document has been checked and approved by the attending provider.    I, Ghazala Wheat DO, was physically present and have reviewed and verified the accuracy of this note documented by Miroslava Fonseca medical scribe.      Ghazala Wheat DO  [facility] EMERGENCY DEPARTMENT  7/5/2025     Ghazala Wheat DO  07/06/25 0441

## 2025-07-06 NOTE — DISCHARGE INSTRUCTIONS
I placed a new referral for general surgery for you.  A  should call you early this week.    Take the antibiotics as prescribed.  Use the sitz bath's as discussed, up to 4 times daily, sitting in a warm bath for 15 minutes per time.  You can use Epsom salt.  Keep the area clean and dry.  Return to the emergency department if you have significant pain with fevers, worsening spreading redness or other

## 2025-07-07 LAB — BACTERIA WND CULT: NORMAL

## 2025-07-10 ENCOUNTER — PATIENT OUTREACH (OUTPATIENT)
Dept: SURGERY | Facility: CLINIC | Age: 35
End: 2025-07-10
Payer: COMMERCIAL

## 2025-07-10 NOTE — PROGRESS NOTES
Patient Telephone Reminder Call    Date of call:  07/10/25  Phone numbers:  Home number on file [phone] (home)  Reached patient/confirmed appointment:  No, left message on voicemail  Appointment with:   Dr. Josh Colon  Reason for visit:  Mass  Remind patient that this visit is a consultation only, NO procedure:  Yes  Can this visit be changed to a video visit:  No                     
normal (ped)...

## 2025-07-21 ENCOUNTER — OFFICE VISIT (OUTPATIENT)
Dept: SURGERY | Facility: CLINIC | Age: 35
End: 2025-07-21
Payer: COMMERCIAL

## 2025-07-21 ENCOUNTER — TELEPHONE (OUTPATIENT)
Dept: SURGERY | Facility: CLINIC | Age: 35
End: 2025-07-21

## 2025-07-21 VITALS
SYSTOLIC BLOOD PRESSURE: 149 MMHG | WEIGHT: 230.4 LBS | OXYGEN SATURATION: 96 % | HEIGHT: 68 IN | HEART RATE: 78 BPM | BODY MASS INDEX: 34.92 KG/M2 | DIASTOLIC BLOOD PRESSURE: 100 MMHG

## 2025-07-21 DIAGNOSIS — D49.2 ABNORMAL SKIN GROWTH: ICD-10-CM

## 2025-07-21 DIAGNOSIS — L05.01 PILONIDAL ABSCESS: Primary | ICD-10-CM

## 2025-07-21 DIAGNOSIS — L73.2 HIDRADENITIS SUPPURATIVA: ICD-10-CM

## 2025-07-21 PROCEDURE — 99207 PR NO CHARGE LOS: CPT | Performed by: SURGERY

## 2025-07-21 ASSESSMENT — PAIN SCALES - GENERAL: PAINLEVEL_OUTOF10: SEVERE PAIN (7)
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
## 2025-07-21 NOTE — NURSING NOTE
"Chief Complaint   Patient presents with    Consult     Abnormal skin growth; Skin growth in the L buttock away from the buttock crease (not pilonidal)         Vitals:    07/21/25 1153   BP: (!) 149/100   BP Location: Left arm   Patient Position: Sitting   Cuff Size: Adult Large   Pulse: 78   SpO2: 96%   Weight: 104.5 kg (230 lb 6.4 oz)   Height: 1.727 m (5' 8\")       Body mass index is 35.03 kg/m .    Sherie Morales, EMT    "

## 2025-07-21 NOTE — TELEPHONE ENCOUNTER
Diagnosis, Referred by & from: Pilonidal abscess // Parisa Gallegos MD   Appt date: 7/24/25   NOTES STATUS DETAILS   OFFICE NOTE from referring provider Internal 7/21/25 - OV Gen Surg   DISCHARGE SUMMARY from hospital Internal [facility]:  5/30/24 - ED to Admission w/   Vikram Key MD    DISCHARGE REPORT from the ER Internal [facility]:  7/5/25 - ED note w/ Ghazala Wheat DO   5/29/24 - ED note w/ Edna Pickett MD    MEDICATION LIST Internal    LABS     DIAGNOSTIC PROCEDURES     UPPER ENDOSCOPY (EGD) Internal [facility]:  5/30/24   IMAGING (DISC & REPORT)      CT Care Everywhere [facility]:  9/1/19 -  CT CHEST/ABDOMEN/PELVIS   XRAY Care Everywhere [facility]:  9/1/19 - XR PELVIS     Records Requested  07/21/25    Facility  [facility]  Fax: [phone]   Outcome -called and requested for images to be pushed to PACS

## 2025-07-21 NOTE — PATIENT INSTRUCTIONS
You met with Dr. Parisa Gallegos.      Today's visit instructions:    We have referred you to Colorectal Surgery for possible pilonidal abscess. A representative from that department will call you to schedule.        If you have questions please contact Liliam RN or Josephine RN during regular clinic hours, Monday through Friday 7:30 AM - 4:00 PM, or you can contact us via [employer] at anytime.       If you have urgent needs after-hours, weekends, or holidays please call the hospital at [phone] and ask to speak with our on-call General Surgery Team.    Appointment [phone]  Nurse Advice (Liliam or Josephine): [phone]   Surgery Scheduler ([place]): [phone]  Billing Customer Service:  [phone]  Fax: [phone]
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
## 2025-07-21 NOTE — TELEPHONE ENCOUNTER
Patient confirmed scheduled appointment:  Date: 7/24/25  Time: 1100 am   Visit type: New Patient   Provider:    Location: [facility]  Testing/imaging: n/a  Additional notes: Pt being referred by  for     Pilonidal abscess

## 2025-07-24 ENCOUNTER — OFFICE VISIT (OUTPATIENT)
Dept: SURGERY | Facility: CLINIC | Age: 35
End: 2025-07-24
Attending: SURGERY
Payer: COMMERCIAL

## 2025-07-24 ENCOUNTER — PRE VISIT (OUTPATIENT)
Dept: SURGERY | Facility: CLINIC | Age: 35
End: 2025-07-24

## 2025-07-24 DIAGNOSIS — L05.01 PILONIDAL ABSCESS: ICD-10-CM

## 2025-07-24 NOTE — PROGRESS NOTES
"Colon and Rectal Surgery Clinic Note    RE: Jim Park.  : 1990.  BEAU: 2025.    Reason for visit: Pilonidal disease.    HPI: recently presented to ED on  requiring I and D for pilonidal abscess.  Baseline bowel habits: ***  Last colonoscopy: ***    Medical history:  Past Medical History:   Diagnosis Date    PTSD (post-traumatic stress disorder)        Surgical history:  Past Surgical History:   Procedure Laterality Date    ESOPHAGOSCOPY, GASTROSCOPY, DUODENOSCOPY (EGD), COMBINED N/A 2024    Procedure: ESOPHAGOGASTRODUODENOSCOPY, WITH BIOPSY;  Surgeon: Carlos Manuel Dial MD;  Location:  GI       Family history:  No Hx of IBD or GI malignancy    Medications:  No current outpatient medications on file.       Allergies:  Allergies   Allergen Reactions    Trazodone Nausea and Vomiting       Social history:   Social History     Tobacco Use    Smoking status: Former     Current packs/day: 0.25     Types: Cigarettes, Vaping Device    Smokeless tobacco: Not on file   Substance Use Topics    Alcohol use: Yes     Comment: Socially, once per week     Marital status: single.    ROS:  A complete review of systems was performed with the patient and all systems negative except as per HPI.    Physical Examination:  Exam was chaperoned by ***  ***  General: Well hydrated. No acute distress.  Abdomen: Soft, NT, ***. No inguinal adenopathy palpated.  Perianal external examination:  Perianal skin: {INTACT / ABNORMAL:872573::\"Intact with no excoriation or lichenification\"}.  Lesions: {YES/NO  Comments:250467::\"No evidence of an external lesion, nodularity, or induration in the perianal region\"}.  Eversion of buttocks: There {WAS/WAS NOT (default):662023::\"was not\"} evidence of an anal fissure. Details: {Not Applicable or free text:774125::\"N/A\"}.  Skin tags or external hemorrhoids: {YES/NO  Comments:475230::\"None\"}.  Digital rectal examination: {Was performed/was not performed/etc:347947}.    Anoscopy: {Was " performed/was not performed/etc:500360441}    Procedures:  ***.    Laboratory values reviewed:  Recent Labs   Lab Test 07/05/25  1846   WBC 8.5   HGB 16.5      CR 0.86   ALBUMIN 4.2   BILITOTAL 1.2   ALKPHOS 108   ALT 20   AST 19       ASSESSMENT  1. Pilonidal disease  2. ***    PLAN  1. ***    Risks, benefits, and alternatives of operative treatment were thoroughly discussed with the patient, he understands these well and agrees to proceed.    Time spent: *** minutes, an additional *** minutes were spent doing *** procedure.  >50% spent in discussing, counseling and coordinating care.    José Miguel Sena M.D    Division of Colon and Rectal Surgery  [facility]    Referring Provider:  Parisa Gallegos MD  [address] [facility] [address] [facility]  [place] [address]     Primary Care Provider:  [facility]

## 2025-07-28 ENCOUNTER — PATIENT OUTREACH (OUTPATIENT)
Dept: CARE COORDINATION | Facility: CLINIC | Age: 35
End: 2025-07-28

## 2025-08-21 ENCOUNTER — PRE VISIT (OUTPATIENT)
Dept: DERMATOLOGY | Facility: CLINIC | Age: 35
End: 2025-08-21
Payer: COMMERCIAL

## 2025-08-21 ENCOUNTER — OFFICE VISIT (OUTPATIENT)
Dept: DERMATOLOGY | Facility: CLINIC | Age: 35
End: 2025-08-21
Attending: STUDENT IN AN ORGANIZED HEALTH CARE EDUCATION/TRAINING PROGRAM
Payer: COMMERCIAL

## 2025-08-21 VITALS
SYSTOLIC BLOOD PRESSURE: 135 MMHG | TEMPERATURE: 98.4 F | OXYGEN SATURATION: 96 % | HEART RATE: 68 BPM | DIASTOLIC BLOOD PRESSURE: 94 MMHG

## 2025-08-21 DIAGNOSIS — L73.2 HIDRADENITIS SUPPURATIVA: ICD-10-CM

## 2025-08-21 DIAGNOSIS — L98.8 PILONIDAL DISEASE: Primary | ICD-10-CM

## 2025-08-21 PROCEDURE — G0463 HOSPITAL OUTPT CLINIC VISIT: HCPCS | Performed by: DERMATOLOGY

## (undated) RX ORDER — FENTANYL CITRATE 50 UG/ML
INJECTION, SOLUTION INTRAMUSCULAR; INTRAVENOUS
Status: DISPENSED
Start: 2024-05-30